# Patient Record
Sex: MALE | Race: OTHER | HISPANIC OR LATINO
[De-identification: names, ages, dates, MRNs, and addresses within clinical notes are randomized per-mention and may not be internally consistent; named-entity substitution may affect disease eponyms.]

---

## 2017-04-26 ENCOUNTER — APPOINTMENT (OUTPATIENT)
Dept: ORTHOPEDIC SURGERY | Facility: CLINIC | Age: 72
End: 2017-04-26

## 2017-05-22 ENCOUNTER — APPOINTMENT (OUTPATIENT)
Dept: ORTHOPEDIC SURGERY | Facility: CLINIC | Age: 72
End: 2017-05-22

## 2017-05-31 ENCOUNTER — APPOINTMENT (OUTPATIENT)
Dept: ORTHOPEDIC SURGERY | Facility: CLINIC | Age: 72
End: 2017-05-31

## 2017-05-31 VITALS — SYSTOLIC BLOOD PRESSURE: 133 MMHG | HEART RATE: 70 BPM | DIASTOLIC BLOOD PRESSURE: 75 MMHG

## 2017-05-31 VITALS — BODY MASS INDEX: 22.9 KG/M2 | WEIGHT: 160 LBS | HEIGHT: 70 IN

## 2017-05-31 DIAGNOSIS — M41.86 OTHER FORMS OF SCOLIOSIS, LUMBAR REGION: ICD-10-CM

## 2017-05-31 DIAGNOSIS — M54.42 LUMBAGO WITH SCIATICA, LEFT SIDE: ICD-10-CM

## 2017-05-31 DIAGNOSIS — M47.26 OTHER SPONDYLOSIS WITH RADICULOPATHY, LUMBAR REGION: ICD-10-CM

## 2017-05-31 DIAGNOSIS — G89.29 LUMBAGO WITH SCIATICA, LEFT SIDE: ICD-10-CM

## 2017-05-31 DIAGNOSIS — M54.16 RADICULOPATHY, LUMBAR REGION: ICD-10-CM

## 2017-06-23 ENCOUNTER — APPOINTMENT (OUTPATIENT)
Dept: ORTHOPEDIC SURGERY | Facility: CLINIC | Age: 72
End: 2017-06-23

## 2018-09-06 ENCOUNTER — APPOINTMENT (OUTPATIENT)
Dept: INTERNAL MEDICINE | Facility: CLINIC | Age: 73
End: 2018-09-06
Payer: MEDICARE

## 2018-09-06 ENCOUNTER — NON-APPOINTMENT (OUTPATIENT)
Age: 73
End: 2018-09-06

## 2018-09-06 VITALS
HEART RATE: 55 BPM | HEIGHT: 67.75 IN | DIASTOLIC BLOOD PRESSURE: 68 MMHG | SYSTOLIC BLOOD PRESSURE: 159 MMHG | BODY MASS INDEX: 24.53 KG/M2 | WEIGHT: 160 LBS

## 2018-09-06 DIAGNOSIS — Z00.00 ENCOUNTER FOR GENERAL ADULT MEDICAL EXAMINATION W/OUT ABNORMAL FINDINGS: ICD-10-CM

## 2018-09-06 DIAGNOSIS — Z87.438 PERSONAL HISTORY OF OTHER DISEASES OF MALE GENITAL ORGANS: ICD-10-CM

## 2018-09-06 LAB
BILIRUB UR QL STRIP: NORMAL
CLARITY UR: CLEAR
COLLECTION METHOD: NORMAL
GLUCOSE UR-MCNC: NORMAL
HCG UR QL: 0.2 EU/DL
HGB UR QL STRIP.AUTO: NORMAL
KETONES UR-MCNC: NORMAL
LEUKOCYTE ESTERASE UR QL STRIP: NORMAL
NITRITE UR QL STRIP: NORMAL
PH UR STRIP: 6.5
PROT UR STRIP-MCNC: NORMAL
SP GR UR STRIP: 1.01

## 2018-09-06 PROCEDURE — 36415 COLL VENOUS BLD VENIPUNCTURE: CPT

## 2018-09-06 PROCEDURE — 93000 ELECTROCARDIOGRAM COMPLETE: CPT | Mod: 59

## 2018-09-06 PROCEDURE — 81003 URINALYSIS AUTO W/O SCOPE: CPT | Mod: QW

## 2018-09-06 PROCEDURE — G0438: CPT

## 2018-09-06 PROCEDURE — 90670 PCV13 VACCINE IM: CPT

## 2018-09-06 PROCEDURE — G0009: CPT

## 2018-09-10 LAB
25(OH)D3 SERPL-MCNC: 41.2 NG/ML
ALBUMIN SERPL ELPH-MCNC: 4.9 G/DL
ALP BLD-CCNC: 50 U/L
ALT SERPL-CCNC: 13 U/L
ANION GAP SERPL CALC-SCNC: 13 MMOL/L
AST SERPL-CCNC: 24 U/L
BASOPHILS # BLD AUTO: 0.02 K/UL
BASOPHILS NFR BLD AUTO: 0.4 %
BILIRUB SERPL-MCNC: 0.4 MG/DL
BUN SERPL-MCNC: 26 MG/DL
CALCIUM SERPL-MCNC: 10 MG/DL
CHLORIDE SERPL-SCNC: 97 MMOL/L
CHOLEST SERPL-MCNC: 157 MG/DL
CHOLEST/HDLC SERPL: 5.1 RATIO
CO2 SERPL-SCNC: 30 MMOL/L
CREAT SERPL-MCNC: 1.39 MG/DL
EOSINOPHIL # BLD AUTO: 0.19 K/UL
EOSINOPHIL NFR BLD AUTO: 3.6 %
GLUCOSE SERPL-MCNC: 96 MG/DL
HBA1C MFR BLD HPLC: 5.5 %
HCT VFR BLD CALC: 41.9 %
HCV RNA SERPL NAA DL=5-ACNC: NOT DETECTED
HCV RNA SERPL NAA+PROBE-LOG IU: NOT DETECTED LOGIU/ML
HDLC SERPL-MCNC: 31 MG/DL
HGB BLD-MCNC: 13.6 G/DL
IMM GRANULOCYTES NFR BLD AUTO: 0.2 %
LDLC SERPL CALC-MCNC: 71 MG/DL
LYMPHOCYTES # BLD AUTO: 1.86 K/UL
LYMPHOCYTES NFR BLD AUTO: 34.9 %
MAN DIFF?: NORMAL
MCHC RBC-ENTMCNC: 31.3 PG
MCHC RBC-ENTMCNC: 32.5 GM/DL
MCV RBC AUTO: 96.5 FL
MONOCYTES # BLD AUTO: 0.48 K/UL
MONOCYTES NFR BLD AUTO: 9 %
NEUTROPHILS # BLD AUTO: 2.77 K/UL
NEUTROPHILS NFR BLD AUTO: 51.9 %
PLATELET # BLD AUTO: 176 K/UL
POTASSIUM SERPL-SCNC: 4.8 MMOL/L
PROT SERPL-MCNC: 8.1 G/DL
PSA SERPL-MCNC: 0.46 NG/ML
RBC # BLD: 4.34 M/UL
RBC # FLD: 12.4 %
SAVE SPECIMEN: NORMAL
SODIUM SERPL-SCNC: 140 MMOL/L
T3FREE SERPL-MCNC: 2.94 PG/ML
T4 SERPL-MCNC: 6.7 UG/DL
TRIGL SERPL-MCNC: 273 MG/DL
TSH SERPL-ACNC: 3.42 UIU/ML
URATE SERPL-MCNC: 7.3 MG/DL
WBC # FLD AUTO: 5.33 K/UL

## 2018-09-10 NOTE — ASSESSMENT
[FreeTextEntry1] : Patient with history of PRescription pain medication abuse, has been clean and on methadone program for many years.  Remains on methadone 145mg daily. He will continue to follow up with  the methadone clinic.  Reccomended screening colonoscopy.  \par \par Recommended strongly he go for a screening colonoscopy;\par \par Hx of hepatitis C check LFT's and Hep C Viral load.  Confirm remission.\par \par Patient has a history of hypertension was doing well on amlodipine benazepril, will simply renew this.  \par \par Hx of neuropathy, unclear cause, no history of diabetes continue gabapentin 300mg PO BID.  \par \par COntinue Aspirin 81mg for cardioprotection.

## 2018-09-10 NOTE — PHYSICAL EXAM
[No Acute Distress] : no acute distress [Well Nourished] : well nourished [Well Developed] : well developed [Well-Appearing] : well-appearing [Normal Sclera/Conjunctiva] : normal sclera/conjunctiva [PERRL] : pupils equal round and reactive to light [EOMI] : extraocular movements intact [Normal Outer Ear/Nose] : the outer ears and nose were normal in appearance [Normal Oropharynx] : the oropharynx was normal [No JVD] : no jugular venous distention [Supple] : supple [No Lymphadenopathy] : no lymphadenopathy [Thyroid Normal, No Nodules] : the thyroid was normal and there were no nodules present [No Respiratory Distress] : no respiratory distress  [Clear to Auscultation] : lungs were clear to auscultation bilaterally [No Accessory Muscle Use] : no accessory muscle use [Normal Rate] : normal rate  [Regular Rhythm] : with a regular rhythm [Normal S1, S2] : normal S1 and S2 [No Murmur] : no murmur heard [No Carotid Bruits] : no carotid bruits [No Abdominal Bruit] : a ~M bruit was not heard ~T in the abdomen [No Varicosities] : no varicosities [Pedal Pulses Present] : the pedal pulses are present [No Edema] : there was no peripheral edema [No Extremity Clubbing/Cyanosis] : no extremity clubbing/cyanosis [No Palpable Aorta] : no palpable aorta [Soft] : abdomen soft [Non Tender] : non-tender [Non-distended] : non-distended [No Masses] : no abdominal mass palpated [No HSM] : no HSM [Normal Bowel Sounds] : normal bowel sounds [Normal Sphincter Tone] : normal sphincter tone [No Mass] : no mass [Stool Occult Blood] : stool negative for occult blood [Normal Posterior Cervical Nodes] : no posterior cervical lymphadenopathy [Normal Anterior Cervical Nodes] : no anterior cervical lymphadenopathy [No CVA Tenderness] : no CVA  tenderness [No Spinal Tenderness] : no spinal tenderness [No Joint Swelling] : no joint swelling [Grossly Normal Strength/Tone] : grossly normal strength/tone [No Rash] : no rash [Normal Gait] : normal gait [Coordination Grossly Intact] : coordination grossly intact [No Focal Deficits] : no focal deficits [Deep Tendon Reflexes (DTR)] : deep tendon reflexes were 2+ and symmetric [Speech Grossly Normal] : speech grossly normal [Memory Grossly Normal] : memory grossly normal [Normal Affect] : the affect was normal [Normal Mood] : the mood was normal [Normal Insight/Judgement] : insight and judgment were intact [de-identified] : normal sized prostate [de-identified] : A number of tattoos across body.

## 2018-09-10 NOTE — HISTORY OF PRESENT ILLNESS
[de-identified] : This is a 72 year old man who presents for CPE and chagne in primary.  Used to see Dr. Sukhjinder Gould a few years ago  treated mostly for hypertension.  He has been on methadone for a number of years.  They have been managing him after Dr. Gould retired but he needed a stable primary.  \par was on Lotrel  ran out for a week 5/10mg\par \par  takes a 81mg aspirin daily too.  \par \par Hx of heroine use, quit 20 years ago in the methadone clinic 145mg of methadone daily for many years.\par \par quit smoking. \par Was in the [facility] .  \par Never had a pneumonia vaccine.  \par \par Has not had a colonoscopy before.  \par \par Has a neuropathy from diabetes.

## 2018-10-05 ENCOUNTER — RX RENEWAL (OUTPATIENT)
Age: 73
End: 2018-10-05

## 2018-10-12 ENCOUNTER — MESSAGE (OUTPATIENT)
Age: 73
End: 2018-10-12

## 2018-10-12 LAB — HEMOCCULT STL QL IA: POSITIVE

## 2018-10-23 ENCOUNTER — APPOINTMENT (OUTPATIENT)
Dept: GASTROENTEROLOGY | Facility: CLINIC | Age: 73
End: 2018-10-23
Payer: MEDICARE

## 2018-10-23 VITALS
WEIGHT: 160 LBS | BODY MASS INDEX: 24.53 KG/M2 | DIASTOLIC BLOOD PRESSURE: 72 MMHG | SYSTOLIC BLOOD PRESSURE: 137 MMHG | HEART RATE: 62 BPM | HEIGHT: 67.75 IN

## 2018-10-23 DIAGNOSIS — R19.5 OTHER FECAL ABNORMALITIES: ICD-10-CM

## 2018-10-23 PROCEDURE — 99204 OFFICE O/P NEW MOD 45 MIN: CPT

## 2018-11-09 ENCOUNTER — APPOINTMENT (OUTPATIENT)
Dept: GASTROENTEROLOGY | Facility: CLINIC | Age: 73
End: 2018-11-09

## 2018-12-03 ENCOUNTER — MEDICATION RENEWAL (OUTPATIENT)
Age: 73
End: 2018-12-03

## 2018-12-03 RX ORDER — AMLODIPINE BESYLATE AND BENAZEPRIL HYDROCHLORIDE 5; 10 MG/1; MG/1
5-10 CAPSULE ORAL
Qty: 90 | Refills: 3 | Status: ACTIVE | COMMUNITY
Start: 2018-09-06 | End: 1900-01-01

## 2018-12-03 RX ORDER — ASPIRIN ENTERIC COATED TABLETS 81 MG 81 MG/1
81 TABLET, DELAYED RELEASE ORAL DAILY
Qty: 90 | Refills: 3 | Status: ACTIVE | COMMUNITY
Start: 2018-09-06 | End: 1900-01-01

## 2018-12-10 ENCOUNTER — MESSAGE (OUTPATIENT)
Age: 73
End: 2018-12-10

## 2019-01-23 LAB — HEMOCCULT STL QL IA: NEGATIVE

## 2019-05-20 ENCOUNTER — APPOINTMENT (OUTPATIENT)
Dept: ORTHOPEDIC SURGERY | Facility: CLINIC | Age: 74
End: 2019-05-20

## 2019-07-24 ENCOUNTER — APPOINTMENT (OUTPATIENT)
Dept: INTERNAL MEDICINE | Facility: CLINIC | Age: 74
End: 2019-07-24
Payer: MEDICARE

## 2019-07-24 VITALS
TEMPERATURE: 97.8 F | OXYGEN SATURATION: 95 % | HEIGHT: 68 IN | DIASTOLIC BLOOD PRESSURE: 68 MMHG | BODY MASS INDEX: 25.01 KG/M2 | SYSTOLIC BLOOD PRESSURE: 124 MMHG | WEIGHT: 165 LBS | HEART RATE: 82 BPM

## 2019-07-24 VITALS — DIASTOLIC BLOOD PRESSURE: 70 MMHG | SYSTOLIC BLOOD PRESSURE: 128 MMHG

## 2019-07-24 DIAGNOSIS — I10 ESSENTIAL (PRIMARY) HYPERTENSION: ICD-10-CM

## 2019-07-24 DIAGNOSIS — K59.09 OTHER CONSTIPATION: ICD-10-CM

## 2019-07-24 DIAGNOSIS — Z87.891 PERSONAL HISTORY OF NICOTINE DEPENDENCE: ICD-10-CM

## 2019-07-24 DIAGNOSIS — G47.00 INSOMNIA, UNSPECIFIED: ICD-10-CM

## 2019-07-24 DIAGNOSIS — F11.10 OPIOID ABUSE, UNCOMPLICATED: ICD-10-CM

## 2019-07-24 PROCEDURE — 99204 OFFICE O/P NEW MOD 45 MIN: CPT | Mod: 25

## 2019-07-24 PROCEDURE — G0444 DEPRESSION SCREEN ANNUAL: CPT | Mod: 59

## 2019-07-24 RX ORDER — TRAZODONE HYDROCHLORIDE 50 MG/1
50 TABLET ORAL
Qty: 30 | Refills: 3 | Status: ACTIVE | COMMUNITY

## 2019-07-24 RX ORDER — SILDENAFIL 20 MG/1
20 TABLET ORAL
Qty: 50 | Refills: 1 | Status: DISCONTINUED | COMMUNITY
Start: 2018-09-06 | End: 2019-07-24

## 2019-07-24 RX ORDER — POLYETHYLENE GLYCOL 3350 AND ELECTROLYTES WITH LEMON FLAVOR 236; 22.74; 6.74; 5.86; 2.97 G/4L; G/4L; G/4L; G/4L; G/4L
236 POWDER, FOR SOLUTION ORAL
Qty: 1 | Refills: 0 | Status: DISCONTINUED | COMMUNITY
Start: 2018-10-29 | End: 2019-07-24

## 2019-07-24 RX ORDER — GABAPENTIN 300 MG/1
300 CAPSULE ORAL TWICE DAILY
Qty: 60 | Refills: 0 | Status: DISCONTINUED | COMMUNITY
Start: 2018-09-06 | End: 2019-07-24

## 2019-07-24 NOTE — HISTORY OF PRESENT ILLNESS
[Spouse] : spouse [FreeTextEntry1] : F/U [de-identified] : 74 yo male comes for transfer of medical care. He saw Dr. Sean Morales once in 9/18 for a CPE but no longer practices primary care. Previously saw Dr. Sukhjinder Gould in [place] who retired.\par \par Hx of HTN - on Lotrel. Compliant with medication.\par \par He follows at the [facility] methadone clinic - on 145 mg daily. Previous history of heroin abuse > 20 yr ago. Had blood work last month at clinic - creatinine notably elevated at 1.5. \par \par He missed his colonoscopy in 11/18. He tells me he was unable to tolerate the prep. He had a repeat FIT (patient thinks was Cologuard) in 1/19 which was negative for blood. 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 He takes Colace daily which provides relief. \par \par He has chronic pain in right hand. Likely has Dupuytren's contracture with 5th digit stuck in flexed position. He thinks has had condition for past few years. He injured right shoulder 2 weeks ago. The pain is slowly improving. He is wearing a sling. \par

## 2019-07-24 NOTE — ASSESSMENT
[FreeTextEntry1] : 1. Hypertension: BP is well-controlled on Amlodipine-Benazepril. Low salt diet.\par \par 2. Patient likely has Duputyren's contracture of right hand involving 5th digit. Referred to Dr. Gatito Ramey for management - pt had appointment with him in 5/19 but was a no-show. He also has right shoulder pain x 2 weeks but slowly improving, monitor for now - consider ortho evaluation if persists.\par \par 3. Chronic methadone-induced constipation. Continue Colace daily. Suggest using Senna at bedtime. He was also recommended to try Miralax daily. He has declined rescheduling colonoscopy as he cannot tolerate the prep. \par \par RTO 9/19 for annual exam.

## 2019-07-24 NOTE — REVIEW OF SYSTEMS
[Negative] : Heme/Lymph [Joint Pain] : joint pain [Back Pain] : no back pain [FreeTextEntry9] : right shoulder / hand pain

## 2019-07-24 NOTE — PHYSICAL EXAM
[No Acute Distress] : no acute distress [Well Nourished] : well nourished [Well Developed] : well developed [Well-Appearing] : well-appearing [Normal Sclera/Conjunctiva] : normal sclera/conjunctiva [PERRL] : pupils equal round and reactive to light [EOMI] : extraocular movements intact [Normal Outer Ear/Nose] : the outer ears and nose were normal in appearance [Normal Oropharynx] : the oropharynx was normal [Supple] : supple [No Lymphadenopathy] : no lymphadenopathy [Thyroid Normal, No Nodules] : the thyroid was normal and there were no nodules present [No Respiratory Distress] : no respiratory distress  [Clear to Auscultation] : lungs were clear to auscultation bilaterally [No Accessory Muscle Use] : no accessory muscle use [Normal Rate] : normal rate  [Regular Rhythm] : with a regular rhythm [Normal S1, S2] : normal S1 and S2 [No Murmur] : no murmur heard [Pedal Pulses Present] : the pedal pulses are present [No Edema] : there was no peripheral edema [Soft] : abdomen soft [Non Tender] : non-tender [Non-distended] : non-distended [No Masses] : no abdominal mass palpated [No HSM] : no HSM [Normal Bowel Sounds] : normal bowel sounds [Normal Sphincter Tone] : normal sphincter tone [No Mass] : no mass [No CVA Tenderness] : no CVA  tenderness [No Spinal Tenderness] : no spinal tenderness [No Joint Swelling] : no joint swelling [Grossly Normal Strength/Tone] : grossly normal strength/tone [No Rash] : no rash [Normal Gait] : normal gait [Normal Affect] : the affect was normal [Normal Mood] : the mood was normal [Normal Voice/Communication] : normal voice/communication [Multiple Tattoos] : multiple tattoos observed [Chest] : on the chest [Lesions On Shoulders] : on the right shoulder [Back] : on the back [de-identified] : friendly male, thin appearance  [de-identified] : right hand: 5th finger stuck in flexed position, some thickening of skin near base of 5th digit; right shoulder - slight limited ROM

## 2019-08-01 ENCOUNTER — APPOINTMENT (OUTPATIENT)
Dept: ORTHOPEDIC SURGERY | Facility: CLINIC | Age: 74
End: 2019-08-01
Payer: MEDICARE

## 2019-08-01 VITALS
HEIGHT: 68 IN | SYSTOLIC BLOOD PRESSURE: 134 MMHG | DIASTOLIC BLOOD PRESSURE: 75 MMHG | BODY MASS INDEX: 25.01 KG/M2 | HEART RATE: 53 BPM | WEIGHT: 165 LBS

## 2019-08-01 DIAGNOSIS — M62.40 CONTRACTURE OF MUSCLE, UNSPECIFIED SITE: ICD-10-CM

## 2019-08-01 DIAGNOSIS — G62.9 POLYNEUROPATHY, UNSPECIFIED: ICD-10-CM

## 2019-08-01 DIAGNOSIS — M24.541 CONTRACTURE, RIGHT HAND: ICD-10-CM

## 2019-08-01 PROCEDURE — 99203 OFFICE O/P NEW LOW 30 MIN: CPT

## 2022-03-23 ENCOUNTER — APPOINTMENT (OUTPATIENT)
Dept: NEUROLOGY | Facility: CLINIC | Age: 77
End: 2022-03-23

## 2022-08-22 ENCOUNTER — APPOINTMENT (OUTPATIENT)
Dept: INTERNAL MEDICINE | Facility: CLINIC | Age: 77
End: 2022-08-22

## 2022-12-06 ENCOUNTER — INPATIENT (INPATIENT)
Facility: HOSPITAL | Age: 77
LOS: 1 days | Discharge: HOME HEALTH | End: 2022-12-08
Attending: INTERNAL MEDICINE | Admitting: INTERNAL MEDICINE

## 2022-12-06 VITALS
TEMPERATURE: 98 F | HEART RATE: 70 BPM | DIASTOLIC BLOOD PRESSURE: 109 MMHG | RESPIRATION RATE: 16 BRPM | SYSTOLIC BLOOD PRESSURE: 203 MMHG | OXYGEN SATURATION: 100 %

## 2022-12-06 DIAGNOSIS — I21.4 NON-ST ELEVATION (NSTEMI) MYOCARDIAL INFARCTION: ICD-10-CM

## 2022-12-06 DIAGNOSIS — G93.40 ENCEPHALOPATHY, UNSPECIFIED: ICD-10-CM

## 2022-12-06 DIAGNOSIS — F11.20 OPIOID DEPENDENCE, UNCOMPLICATED: ICD-10-CM

## 2022-12-06 DIAGNOSIS — Z00.00 ENCOUNTER FOR GENERAL ADULT MEDICAL EXAMINATION WITHOUT ABNORMAL FINDINGS: ICD-10-CM

## 2022-12-06 DIAGNOSIS — I16.0 HYPERTENSIVE URGENCY: ICD-10-CM

## 2022-12-06 DIAGNOSIS — R77.8 OTHER SPECIFIED ABNORMALITIES OF PLASMA PROTEINS: ICD-10-CM

## 2022-12-06 DIAGNOSIS — R41.82 ALTERED MENTAL STATUS, UNSPECIFIED: ICD-10-CM

## 2022-12-06 DIAGNOSIS — R60.0 LOCALIZED EDEMA: ICD-10-CM

## 2022-12-06 LAB
A1C WITH ESTIMATED AVERAGE GLUCOSE RESULT: 5.5 % — SIGNIFICANT CHANGE UP (ref 4–5.6)
AMPHET UR-MCNC: NEGATIVE — SIGNIFICANT CHANGE UP
APAP SERPL-MCNC: <10 UG/ML — LOW (ref 15–25)
APPEARANCE UR: CLEAR — SIGNIFICANT CHANGE UP
APTT BLD: 28.9 SEC — SIGNIFICANT CHANGE UP (ref 27–36.3)
BARBITURATES UR SCN-MCNC: NEGATIVE — SIGNIFICANT CHANGE UP
BASE EXCESS BLDV CALC-SCNC: 2 MMOL/L — SIGNIFICANT CHANGE UP (ref -2–3)
BASE EXCESS BLDV CALC-SCNC: 5.1 MMOL/L — HIGH (ref -2–3)
BASOPHILS # BLD AUTO: 0.02 K/UL — SIGNIFICANT CHANGE UP (ref 0–0.2)
BASOPHILS NFR BLD AUTO: 0.3 % — SIGNIFICANT CHANGE UP (ref 0–2)
BENZODIAZ UR-MCNC: NEGATIVE — SIGNIFICANT CHANGE UP
BILIRUB UR-MCNC: NEGATIVE — SIGNIFICANT CHANGE UP
BLOOD GAS VENOUS COMPREHENSIVE RESULT: SIGNIFICANT CHANGE UP
CHLORIDE BLDV-SCNC: 99 MMOL/L — SIGNIFICANT CHANGE UP (ref 96–108)
CHOLEST SERPL-MCNC: 116 MG/DL — SIGNIFICANT CHANGE UP
CO2 BLDV-SCNC: 26.7 MMOL/L — HIGH (ref 22–26)
CO2 BLDV-SCNC: 29.2 MMOL/L — HIGH (ref 22–26)
COCAINE METAB.OTHER UR-MCNC: NEGATIVE — SIGNIFICANT CHANGE UP
COLOR SPEC: YELLOW — SIGNIFICANT CHANGE UP
CREATININE URINE RESULT, DAU: 102 MG/DL — SIGNIFICANT CHANGE UP
DIFF PNL FLD: NEGATIVE — SIGNIFICANT CHANGE UP
EOSINOPHIL # BLD AUTO: 0.01 K/UL — SIGNIFICANT CHANGE UP (ref 0–0.5)
EOSINOPHIL NFR BLD AUTO: 0.1 % — SIGNIFICANT CHANGE UP (ref 0–6)
ESTIMATED AVERAGE GLUCOSE: 111 — SIGNIFICANT CHANGE UP
ETHANOL SERPL-MCNC: <10 MG/DL — SIGNIFICANT CHANGE UP
FLUAV AG NPH QL: SIGNIFICANT CHANGE UP
FLUBV AG NPH QL: SIGNIFICANT CHANGE UP
GAS PNL BLDV: 136 MMOL/L — SIGNIFICANT CHANGE UP (ref 136–145)
GAS PNL BLDV: SIGNIFICANT CHANGE UP
GLUCOSE BLDV-MCNC: 110 MG/DL — HIGH (ref 70–99)
GLUCOSE UR QL: NEGATIVE — SIGNIFICANT CHANGE UP
HCO3 BLDV-SCNC: 26 MMOL/L — SIGNIFICANT CHANGE UP (ref 22–29)
HCO3 BLDV-SCNC: 28 MMOL/L — SIGNIFICANT CHANGE UP (ref 22–29)
HCT VFR BLD CALC: 40.4 % — SIGNIFICANT CHANGE UP (ref 39–50)
HCT VFR BLDA CALC: 42 % — SIGNIFICANT CHANGE UP (ref 39–51)
HDLC SERPL-MCNC: 41 MG/DL — SIGNIFICANT CHANGE UP
HGB BLD CALC-MCNC: 14.1 G/DL — SIGNIFICANT CHANGE UP (ref 13–17)
HGB BLD-MCNC: 13.9 G/DL — SIGNIFICANT CHANGE UP (ref 13–17)
IANC: 5.29 K/UL — SIGNIFICANT CHANGE UP (ref 1.8–7.4)
IMM GRANULOCYTES NFR BLD AUTO: 0.3 % — SIGNIFICANT CHANGE UP (ref 0–0.9)
INR BLD: 1.2 RATIO — HIGH (ref 0.88–1.16)
KETONES UR-MCNC: ABNORMAL
LACTATE BLDV-MCNC: 2.4 MMOL/L — HIGH (ref 0.5–2)
LEUKOCYTE ESTERASE UR-ACNC: NEGATIVE — SIGNIFICANT CHANGE UP
LIDOCAIN IGE QN: 23 U/L — SIGNIFICANT CHANGE UP (ref 7–60)
LIPID PNL WITH DIRECT LDL SERPL: 62 MG/DL — SIGNIFICANT CHANGE UP
LYMPHOCYTES # BLD AUTO: 1.24 K/UL — SIGNIFICANT CHANGE UP (ref 1–3.3)
LYMPHOCYTES # BLD AUTO: 17.7 % — SIGNIFICANT CHANGE UP (ref 13–44)
MAGNESIUM SERPL-MCNC: 1.9 MG/DL — SIGNIFICANT CHANGE UP (ref 1.6–2.6)
MCHC RBC-ENTMCNC: 31.2 PG — SIGNIFICANT CHANGE UP (ref 27–34)
MCHC RBC-ENTMCNC: 34.4 GM/DL — SIGNIFICANT CHANGE UP (ref 32–36)
MCV RBC AUTO: 90.6 FL — SIGNIFICANT CHANGE UP (ref 80–100)
METHADONE UR-MCNC: POSITIVE
MONOCYTES # BLD AUTO: 0.44 K/UL — SIGNIFICANT CHANGE UP (ref 0–0.9)
MONOCYTES NFR BLD AUTO: 6.3 % — SIGNIFICANT CHANGE UP (ref 2–14)
NEUTROPHILS # BLD AUTO: 5.29 K/UL — SIGNIFICANT CHANGE UP (ref 1.8–7.4)
NEUTROPHILS NFR BLD AUTO: 75.3 % — SIGNIFICANT CHANGE UP (ref 43–77)
NITRITE UR-MCNC: NEGATIVE — SIGNIFICANT CHANGE UP
NON HDL CHOLESTEROL: 75 MG/DL — SIGNIFICANT CHANGE UP
NRBC # BLD: 0 /100 WBCS — SIGNIFICANT CHANGE UP (ref 0–0)
NRBC # FLD: 0 K/UL — SIGNIFICANT CHANGE UP (ref 0–0)
OPIATES UR-MCNC: NEGATIVE — SIGNIFICANT CHANGE UP
OXYCODONE UR-MCNC: NEGATIVE — SIGNIFICANT CHANGE UP
PCO2 BLDV: 27 MMHG — LOW (ref 42–55)
PCO2 BLDV: 47 MMHG — SIGNIFICANT CHANGE UP (ref 42–55)
PCP SPEC-MCNC: SIGNIFICANT CHANGE UP
PCP UR-MCNC: NEGATIVE — SIGNIFICANT CHANGE UP
PH BLDV: 7.38 — SIGNIFICANT CHANGE UP (ref 7.32–7.43)
PH BLDV: 7.59 — HIGH (ref 7.32–7.43)
PH UR: 7.5 — SIGNIFICANT CHANGE UP (ref 5–8)
PHOSPHATE SERPL-MCNC: 1.2 MG/DL — LOW (ref 2.5–4.5)
PLATELET # BLD AUTO: 211 K/UL — SIGNIFICANT CHANGE UP (ref 150–400)
PO2 BLDV: 35 MMHG — SIGNIFICANT CHANGE UP
PO2 BLDV: 46 MMHG — SIGNIFICANT CHANGE UP
POTASSIUM BLDV-SCNC: 4.4 MMOL/L — SIGNIFICANT CHANGE UP (ref 3.5–5.1)
PROT UR-MCNC: ABNORMAL
PROTHROM AB SERPL-ACNC: 14 SEC — HIGH (ref 10.5–13.4)
RBC # BLD: 4.46 M/UL — SIGNIFICANT CHANGE UP (ref 4.2–5.8)
RBC # FLD: 11.8 % — SIGNIFICANT CHANGE UP (ref 10.3–14.5)
RSV RNA NPH QL NAA+NON-PROBE: SIGNIFICANT CHANGE UP
SALICYLATES SERPL-MCNC: <0.3 MG/DL — LOW (ref 15–30)
SAO2 % BLDV: 69 % — SIGNIFICANT CHANGE UP
SAO2 % BLDV: 75.3 % — SIGNIFICANT CHANGE UP
SARS-COV-2 RNA SPEC QL NAA+PROBE: SIGNIFICANT CHANGE UP
SP GR SPEC: 1.02 — SIGNIFICANT CHANGE UP (ref 1.01–1.05)
THC UR QL: NEGATIVE — SIGNIFICANT CHANGE UP
TOXICOLOGY SCREEN, DRUGS OF ABUSE, SERUM RESULT: SIGNIFICANT CHANGE UP
TRIGL SERPL-MCNC: 64 MG/DL — SIGNIFICANT CHANGE UP
TROPONIN T, HIGH SENSITIVITY RESULT: 42 NG/L — SIGNIFICANT CHANGE UP
TROPONIN T, HIGH SENSITIVITY RESULT: 44 NG/L — SIGNIFICANT CHANGE UP
TSH SERPL-MCNC: 2.43 UIU/ML — SIGNIFICANT CHANGE UP (ref 0.27–4.2)
UROBILINOGEN FLD QL: SIGNIFICANT CHANGE UP
WBC # BLD: 7.02 K/UL — SIGNIFICANT CHANGE UP (ref 3.8–10.5)
WBC # FLD AUTO: 7.02 K/UL — SIGNIFICANT CHANGE UP (ref 3.8–10.5)

## 2022-12-06 PROCEDURE — 99223 1ST HOSP IP/OBS HIGH 75: CPT | Mod: GC,AI

## 2022-12-06 PROCEDURE — 71046 X-RAY EXAM CHEST 2 VIEWS: CPT | Mod: 26

## 2022-12-06 PROCEDURE — 70450 CT HEAD/BRAIN W/O DYE: CPT | Mod: 26,MB

## 2022-12-06 PROCEDURE — 99223 1ST HOSP IP/OBS HIGH 75: CPT

## 2022-12-06 PROCEDURE — 99291 CRITICAL CARE FIRST HOUR: CPT

## 2022-12-06 RX ORDER — SODIUM CHLORIDE 9 MG/ML
1000 INJECTION INTRAMUSCULAR; INTRAVENOUS; SUBCUTANEOUS ONCE
Refills: 0 | Status: COMPLETED | OUTPATIENT
Start: 2022-12-06 | End: 2022-12-06

## 2022-12-06 RX ORDER — HALOPERIDOL DECANOATE 100 MG/ML
5 INJECTION INTRAMUSCULAR ONCE
Refills: 0 | Status: COMPLETED | OUTPATIENT
Start: 2022-12-06 | End: 2022-12-06

## 2022-12-06 RX ORDER — ENOXAPARIN SODIUM 100 MG/ML
40 INJECTION SUBCUTANEOUS EVERY 24 HOURS
Refills: 0 | Status: DISCONTINUED | OUTPATIENT
Start: 2022-12-06 | End: 2022-12-07

## 2022-12-06 RX ORDER — SODIUM,POTASSIUM PHOSPHATES 278-250MG
1 POWDER IN PACKET (EA) ORAL ONCE
Refills: 0 | Status: DISCONTINUED | OUTPATIENT
Start: 2022-12-06 | End: 2022-12-06

## 2022-12-06 RX ORDER — ASPIRIN/CALCIUM CARB/MAGNESIUM 324 MG
325 TABLET ORAL ONCE
Refills: 0 | Status: COMPLETED | OUTPATIENT
Start: 2022-12-06 | End: 2022-12-06

## 2022-12-06 RX ORDER — ACETAMINOPHEN 500 MG
650 TABLET ORAL EVERY 6 HOURS
Refills: 0 | Status: DISCONTINUED | OUTPATIENT
Start: 2022-12-06 | End: 2022-12-08

## 2022-12-06 RX ORDER — SODIUM,POTASSIUM PHOSPHATES 278-250MG
1 POWDER IN PACKET (EA) ORAL ONCE
Refills: 0 | Status: COMPLETED | OUTPATIENT
Start: 2022-12-06 | End: 2022-12-06

## 2022-12-06 RX ORDER — LABETALOL HCL 100 MG
100 TABLET ORAL ONCE
Refills: 0 | Status: COMPLETED | OUTPATIENT
Start: 2022-12-06 | End: 2022-12-06

## 2022-12-06 RX ADMIN — Medication 325 MILLIGRAM(S): at 12:24

## 2022-12-06 RX ADMIN — SODIUM CHLORIDE 1000 MILLILITER(S): 9 INJECTION INTRAMUSCULAR; INTRAVENOUS; SUBCUTANEOUS at 10:17

## 2022-12-06 RX ADMIN — Medication 85 MILLIMOLE(S): at 21:29

## 2022-12-06 RX ADMIN — Medication 100 MILLIGRAM(S): at 18:40

## 2022-12-06 RX ADMIN — Medication 1 MILLIGRAM(S): at 20:06

## 2022-12-06 RX ADMIN — Medication 2 MILLIGRAM(S): at 10:15

## 2022-12-06 RX ADMIN — Medication 1 PACKET(S): at 12:25

## 2022-12-06 NOTE — H&P ADULT - PROBLEM SELECTOR PLAN 2
Pt w/ increased irritability, anxiety, forgetfulness, insomnia, hypertensive, tachycardic   Given hx of addiction disorder, pt may be in acute withdrawal   Alcohol level <10  Will obtain Utox, HIV   Will place on CIWA protocol  Repleting electrolytes  BH

## 2022-12-06 NOTE — H&P ADULT - PROBLEM SELECTOR PLAN 4
Pt w/ elevated troponins 57->42, likely iso hypertensive urgency  EKG NSR, non-concerning for acute cardiac event   pro-, less likely acute heart failure  Pt asymptomatic   Will place on telemetry   TTE  Obtain A1c, lipid profile for risk evaluation

## 2022-12-06 NOTE — CONSULT NOTE ADULT - ASSESSMENT
77M with history of opioid use disorder, htn, with no known cardiac history presenting with AMS, confusion, anxiety, and elevated blood pressure in the setting of stopping recent psychiatric medications. Vitals significant for BP in the 180s-190s systolic, physical exam unremarkable, ekg showing Normal sinus rhythm without any abnormalities. On my exam he is AOx-2-3 (with prompting) and pleasant. He reports no recent cardiac symptoms except for acute worsening of his chronic lower extremity edema which I was able to appreciate on exam. Labs significant for a mild elevation in troponin.     #Hypertensive urgency  #Troponin elevation   -Troponin elevation likely 2/2 elevated blood pressures which can be seen in periods of acute agitation/untreated bp blood pressure and not ACS. Would trend to peak and obtain TTE   -Please start antihypertensive medications to Reduce BP to <160/100 over several  hrs; then to normal (<130/90) over 1-3d  -Can obtain lipid panel and hemoglobin a1c for risk stratification and comorbid disease assessment   -Rest of care per ED team

## 2022-12-06 NOTE — ED PROVIDER NOTE - PROGRESS NOTE DETAILS
FIDEL Crawford PGY2 critical trop called in of 57.  and repeat ecg ordered. cards aware. FIDEL Crawford PGY2 spoke to hospitalist who is aware of the need for psychiatry in the setting of failure to thrive and thoughts of dying. CT with potential frontotemporal atrophy? read as normal. discussed possible neurology consultation with hospitalist.

## 2022-12-06 NOTE — ED PROVIDER NOTE - NSICDXPASTMEDICALHX_GEN_ALL_CORE_FT
PAST MEDICAL HISTORY:  Drug abuse and dependence Former heroin abuser, on methadone. Last use in 2013, on methadone for 45 years. Followed at [facility] methadone clinic.    Peripheral neuropathy

## 2022-12-06 NOTE — H&P ADULT - NSHPSOCIALHISTORY_GEN_ALL_CORE
Social History:  Smoking History:  Alcohol Use:  Drug Use: Pt lives with wife, retired. Denies alcohol use, smoking, drug use other than the scheduled methadone. Pt has a daughter with his wife and two stepchildren. They have a close relationship.

## 2022-12-06 NOTE — CONSULT NOTE ADULT - ATTENDING COMMENTS
personally saw and examined patient  labs and vitals reviewed  agree with above assessment and plan  pt comfortable appearing, no cv symptoms at this time, very low suspicion for acs here.   bp improved from admission  unclear if prev hx of htn, pt denies being on any antihypertensive meds at home  pt with recent stoppage of trazodone, unclear why, pt states it was not renewed  appreciate psych recs for psych meds  would be hesitant to start a significant regimen for bp, also goal today is 160s systolic.  monitor on tele  will follow with you

## 2022-12-06 NOTE — CONSULT NOTE ADULT - SUBJECTIVE AND OBJECTIVE BOX
Kevon Rubio MD  Cardiology Fellow  [phone]  All Cardiology service information can be found 24/7 on [email or web address], password: [email or web address]    Patient seen and evaluated at bedside    Chief Complaint:    HPI:  77M with history of opioid use disorder on methadone, htn not on any medications, and does not follow regularly with doctors presenting with AMS in setting of stopping Trazadone. Initially he came (history from ED and wife) with anxiety, forgetfulness, not eating, and insomnia. Reported in the ED he wishes he was dead.    On my exam this AM he is pleasant without SI. Reports no chest pain, palpitations, dyspnea, orthopnea, decreased ET but does report two weeks of LE edema R>L that is chronic.    PMHx:   Peripheral neuropathy    Drug abuse and dependence        PSHx:   No significant past surgical history        Allergies:  No Known Allergies      Home Meds:    Current Medications:       FAMILY HISTORY:      Social History:  Former smoker and ETOH user, last heroin use 2013?     REVIEW OF SYSTEMS:  CONSTITUTIONAL: No weakness, fevers or chills  EYES/ENT: No visual changes;  No dysphagia  NECK: No pain or stiffness  RESPIRATORY: No cough, wheezing, hemoptysis; No shortness of breath  CARDIOVASCULAR: No chest pain or palpitations; + le edema   GASTROINTESTINAL: No abdominal or epigastric pain. No nausea, vomiting, or hematemesis; No diarrhea or constipation. No melena or hematochezia.  BACK: No back pain  GENITOURINARY: No dysuria, frequency or hematuria  NEUROLOGICAL: No numbness or weakness  SKIN: No itching, burning, rashes, or lesions   All other review of systems is negative unless indicated above.    Physical Exam:  T(F): 99.9 (12-06), Max: 99.9 (12-06)  HR: 74 (12-06) (70 - 74)  BP: 190/88 (12-06) (190/88 - 203/109)  RR: 20 (12-06)  SpO2: 100% (12-06)  GENERAL: No acute distress, well-developed  HEAD:  Atraumatic, Normocephalic  ENT: EOMI, PERRLA, conjunctiva and sclera clear, Neck supple, No JVD, moist mucosa  CHEST/LUNG: Clear to auscultation bilaterally; No wheeze, equal breath sounds bilaterally   BACK: No spinal tenderness  HEART: Regular rate and rhythm; No murmurs, rubs, or gallops  ABDOMEN: Soft, Nontender, Nondistended; Bowel sounds present  EXTREMITIES:  LE edema R >L   PSYCH: Nl behavior, nl affect  NEUROLOGY: AAOx3, non-focal, cranial nerves intact  SKIN: Normal color, No rashes or lesions  LINES:    Cardiovascular Diagnostic Testing:    ECG:   Normal Sinus rhythm     Imaging:    CXR: Personally reviewed    Labs: Personally reviewed                        13.9   7.02  )-----------( 211      ( 06 Dec 2022 09:30 )             40.4     12-06    139  |  98  |  29<H>  ----------------------------<  102<H>  4.6   |  26  |  1.24    Ca    10.3      06 Dec 2022 09:30  Phos  1.2     12-06  Mg     1.90     12-06    TPro  7.7  /  Alb  4.9  /  TBili  1.0  /  DBili  x   /  AST  49<H>  /  ALT  26  /  AlkPhos  55  12-06    PT/INR - ( 06 Dec 2022 09:30 )   PT: 14.0 sec;   INR: 1.20 ratio         PTT - ( 06 Dec 2022 09:30 )  PTT:28.9 sec    CARDIAC MARKERS ( 06 Dec 2022 09:30 )  57 ng/L / x     / x     / x     / x     / x            Serum Pro-Brain Natriuretic Peptide: 194 pg/mL (12-06 @ 09:30)        Thyroid Stimulating Hormone, Serum: 2.43 uIU/mL (12-06 @ 09:30)

## 2022-12-06 NOTE — ED ADULT NURSE NOTE - NSICDXPASTMEDICALHX_GEN_ALL_CORE_FT
PAST MEDICAL HISTORY:  Drug abuse and dependence Former heroin abuser, on methadone. Last use in 2013, on methadone for 45 years. Followed at [facility] methadone clinic.    Peripheral neuropathy

## 2022-12-06 NOTE — ED ADULT NURSE NOTE - OBJECTIVE STATEMENT
Ptis alert and orientedx2, confused to time and situation. Pt is ambulatory at baseline, arrived to the ED with SOB, generalized weakness and confusion. Pt has no neuro deficits on exam but is confused and to why he came to the hospital. His wife says that she has been worried about him because he has been depressed recently and not taking care of himself. Pt has a ID card from [facility] but says he hasn't been there before. Pt denies chest pain but is visibly dyspnoic on exertion. Pt is NSR on the cardiac monitor. denies nausea, vomiting and diarrhea. Pt has some red skin and +1 swelling around his ankles. pt does have a history of substance abuse but did not specify, pt is on methadone (dose/ frequency unknown). B/L IV 20G placed in the FA, labs sent, waiting for urine, bed in lowest position, will continue to monitor.

## 2022-12-06 NOTE — ED ADULT NURSE NOTE - NSIMPLEMENTINTERV_GEN_ALL_ED
Implemented All Universal Safety Interventions:  [place] to call system. Call bell, personal items and telephone within reach. Instruct patient to call for assistance. Room bathroom lighting operational. Non-slip footwear when patient is off stretcher. Physically safe environment: no spills, clutter or unnecessary equipment. Stretcher in lowest position, wheels locked, appropriate side rails in place.

## 2022-12-06 NOTE — H&P ADULT - NSHPREVIEWOFSYSTEMS_GEN_ALL_CORE
REVIEW OF SYSTEMS:  CONSTITUTIONAL: +weakness, No  fevers or chills  EYES/ENT: No visual changes;  No dysphagia  NECK: No pain or stiffness  RESPIRATORY: + SOB earlier today, resolved. No cough, wheezing, hemoptysis;   CARDIOVASCULAR: + LE edema. No chest pain or palpitations;    GASTROINTESTINAL: No abdominal or epigastric pain. No nausea, vomiting, or hematemesis; No diarrhea or constipation. No melena or hematochezia.  BACK: No back pain  GENITOURINARY: + Urinary retention. No dysuria or hematuria  NEUROLOGICAL: No numbness or weakness  SKIN: No itching, burning, rashes, or lesions   All other review of systems is negative unless indicated above.

## 2022-12-06 NOTE — H&P ADULT - NSHPLABSRESULTS_GEN_ALL_CORE
Labs: Personally reviewed                        13.9   7.02  )-----------( 211      ( 06 Dec 2022 09:30 )             40.4     12-06    139  |  98  |  29<H>  ----------------------------<  102<H>  4.6   |  26  |  1.24    Ca    10.3      06 Dec 2022 09:30  Phos  1.2     12-06  Mg     1.90     12-06    TPro  7.7  /  Alb  4.9  /  TBili  1.0  /  DBili  x   /  AST  49<H>  /  ALT  26  /  AlkPhos  55  12-06    PT/INR - ( 06 Dec 2022 09:30 )   PT: 14.0 sec;   INR: 1.20 ratio         PTT - ( 06 Dec 2022 09:30 )  PTT:28.9 sec    CARDIAC MARKERS ( 06 Dec 2022 12:43 )  42 ng/L / x     / x     / x     / x     / x      CARDIAC MARKERS ( 06 Dec 2022 09:30 )  57 ng/L / x     / x     / x     / x     / x            Serum Pro-Brain Natriuretic Peptide: 194 pg/mL (12-06 @ 09:30)        Thyroid Stimulating Hormone, Serum: 2.43 uIU/mL (12-06 @ 09:30)

## 2022-12-06 NOTE — H&P ADULT - PROBLEM SELECTOR PLAN 3
Pt on chronic methadone therapy, says on 133 mg daily  Refuses today's methadone dose  Will obtain records of methadone and dose per outpatient levels

## 2022-12-06 NOTE — ED PROVIDER NOTE - CLINICAL SUMMARY MEDICAL DECISION MAKING FREE TEXT BOX
pt with long term methadone use presents with failure to thrive and unwillingness to care for himself without idenitifiable organic cause. questionable peaked T waves on ecg, will screen for hyperkalemia and other electrolyte derangements in the absence of adequate PO intake. psych consult, likely admit.

## 2022-12-06 NOTE — ED PROVIDER NOTE - ATTENDING CONTRIBUTION TO CARE
I have personally performed a face to face medical and diagnostic evaluation of the patient. I have discussed with and reviewed the Resident's note and agree with the History, ROS, Physical Exam and MDM unless otherwise indicated. A brief summary of my personal evaluation and impression can be found below.    Oneyda RODRIGUEZ: 77-year-old male history of prior heroin and abuse on methadone stopped taking trazodone 5 or 6 days ago.  Patient reports is unsure why he is in the hospital however just says that he does not feel good.  Is unable to localize symptoms further.  Has no chest pain shortness of breath no nausea no vomiting no fevers no focal weakness but complains of diffuse body weakness.  Collateral obtained by wife is that he is not acting like his normal self at home he is pacing at night not eating and irritable and not taking care of himself per wife collateral patient has been reporting that he "no longer wants to live ". Patient is unable to elaborate on what medicines he is taking.    All other ROS negative, except as above and as per HPI and ROS section.    VITALS: Initial triage and subsequent vitals have been reviewed by me.  GEN APPEARANCE: WDWN, alert, non-toxic, NAD  HEAD: Atraumatic.  EYES: PERRLa, EOMI, vision grossly intact.   NECK: Supple  CV: RRR, S1S2, no c/r/m/g. Cap refill <2 seconds. No bruits.   LUNGS: CTAB. No abnormal breath sounds.  ABDOMEN: Soft, NTND. No guarding or rebound.   MSK/EXT: No spinal or extremity point tenderness. No CVA ttp. Pelvis stable. No peripheral edema.  NEURO: Alert, follows commands. Weight bearing normal. Speech normal. Sensation and motor normal x4 extremities.   SKIN: Warm, dry and intact. No rash.  PSYCH: Appropriate    Plan/MDM:Oneyda RODRIGUEZ: 77-year-old male history of prior heroin and abuse on methadone stopped taking trazodone 5 or 6 days ago.  Patient reports is unsure why he is in the hospital however just says that he does not feel good.  Is unable to localize symptoms further.  Has no chest pain shortness of breath no nausea no vomiting no fevers no focal weakness but complains of diffuse body weakness.  Collateral obtained by wife is that he is not acting like his normal self at home he is pacing at night not eating and irritable and not taking care of himself per wife collateral patient has been reporting that he "no longer wants to live " Exam vital signs stable nontoxic-appearing.  DDx concern for possible failure to thrive versus SI possibly complicated by dementia or depression.  Will evaluate for infectious and metabolic etiologies CT head give meds as needed anticipate admission.   consult when medically cleared.

## 2022-12-06 NOTE — ED ADULT TRIAGE NOTE - CHIEF COMPLAINT QUOTE
Pt c/o flu-like symptoms, including shortness of breath, generalized malaise for the past several days, hypertensive. Pt denies any present pain.

## 2022-12-06 NOTE — H&P ADULT - ATTENDING COMMENTS
78 y/o M w/ PMH heroin use disorder now on methadone, unmanaged HTN, presents with 4 days of AMS, found to have  hypertensive urgency.   Acute metabolic encephalopathy pos sec to withdrawal , filled 30 day RX trazodone , finished less than 30 days.  Suicidal verbalization , psych follow up C/w 1:1.  check U tox   CIWA protocol   Lower BP per cardio recs, check TTE.

## 2022-12-06 NOTE — ED PROVIDER NOTE - CARE PLAN
1 Principal Discharge DX:	NSTEMI (non-ST elevation myocardial infarction)  Secondary Diagnosis:	Adult failure to thrive

## 2022-12-06 NOTE — H&P ADULT - HISTORY OF PRESENT ILLNESS
Patient seen and evaluated at bedside    HPI:  76 y/o M w/ PMH heroin use disorder now on methadone, unmanaged HTN, presents with 4 days of AMS. Pt w/ previous heroin use for 50 years, now compliant with methadone, says taking 133mg per day. Also has been taking trazodone 50mg for sleep for the past two years. Pt stopped taking trazadone 5-6d ago because he cannot get anymore (wife is unsure why, pt says can't get a hold of outpatient provider). Per surescripts, pt picked up a 30 day supply Nov 18 (prescribed by Ellyn Dwyer, addiction psychiatrist).  Per wife for the past 5-6 days,  pt has been forgetting whether or not he takes his doses, has been pacing all night, not eating,  irritable, not sleeping, not participating in self-care/bathing. Pt also endorsed some SOB previously, 2 weeks of LE edema R>L, malaise. Pt also endorses urinary retention. Pt says has not been able to urinate today, no prior hx. Denies F/C/N/V, C/D, current SOB, CP, palpitations, abd pain, HA, dizziness, blurry vision.  Denies drug use, smoking, alcohol use.     In ED, pt noted to be hypertensive to 200s with mild troponemia, which downtrended. Cardiology was consulted, troponemia likely iso hypertensive urgency. Pt received 1L IVF,  2mg Ativan, ASA.  Pt reported with unsteady gait. Endorsed suicidal ideation and was placed on a 1:1.

## 2022-12-06 NOTE — H&P ADULT - NSHPPHYSICALEXAM_GEN_ALL_CORE
Physical Exam:  T(F): 99.9 (12-06), Max: 99.9 (12-06)  HR: 78 (12-06) (70 - 78)  BP: 178/84 (12-06) (178/84 - 203/109)  RR: 18 (12-06)  SpO2: 99% (12-06)    GENERAL: No acute distress, well-developed  HEAD:  Atraumatic, Normocephalic  ENT: EOMI, PERRLA, conjunctiva and sclera clear, Neck supple, No JVD, moist mucosa  CHEST/LUNG: Clear to auscultation bilaterally; No wheeze, equal breath sounds bilaterally   BACK: No spinal tenderness  HEART: Regular rate and rhythm; No murmurs, rubs, or gallops  ABDOMEN: Soft, Nontender, Nondistended; Bowel sounds present  EXTREMITIES:  No clubbing, cyanosis, or edema  PSYCH: Nl behavior, nl affect  NEUROLOGY: AAOx3, non-focal, cranial nerves intact  SKIN: Normal color, No rashes or lesions Physical Exam:  T(F): 99.9 (12-06), Max: 99.9 (12-06)  HR: 78 (12-06) (70 - 78)  BP: 178/84 (12-06) (178/84 - 203/109)  RR: 18 (12-06)  SpO2: 99% (12-06)    GENERAL: Restless, fidgety.   HEAD:  Atraumatic, Normocephalic  ENT: EOMI, PERRLA, conjunctiva and sclera clear, Neck supple, No JVD, moist mucosa  CHEST/LUNG: Clear to auscultation bilaterally; No wheeze, equal breath sounds bilaterally   BACK: No spinal tenderness  HEART: Systolic ejection murmur right sternal border   ABDOMEN: Soft, Nontender, Nondistended; Bowel sounds present  EXTREMITIES: 1+ Pitting edema R>L,  No clubbing, cyanosis,    PSYCH: Restless, fidgety, normal affect   NEUROLOGY: AAOx3, non-focal, cranial nerves intact  SKIN: Normal color, No rashes or lesions

## 2022-12-06 NOTE — H&P ADULT - ASSESSMENT
78 y/o M w/ PMH heroin use disorder now on methadone, unmanaged HTN, presents with 4 days of AMS. 76 y/o M w/ PMH heroin use disorder now on methadone, unmanaged HTN, presents with 4 days of AMS, found to be in hypertensive urgency.

## 2022-12-06 NOTE — H&P ADULT - NSICDXPASTMEDICALHX_GEN_ALL_CORE_FT
PAST MEDICAL HISTORY:  Drug abuse and dependence Former heroin abuser, on methadone. Last use in 2013, on methadone for 45 years. Followed at [facility] methadone clinic.    Peripheral neuropathy

## 2022-12-06 NOTE — ED PROVIDER NOTE - OBJECTIVE STATEMENT
methadone for 50 years for hx heroin use. 2y ago started trazadone for insomnia. stopped taking trazadone 5-6d ago because he cannot get anymore (wife is unsure why). Wife believes he is starting to get dementia because he's been forgetting whether or not he takes his doses. pacing all night. not eating. irritable. not taking care of himself/bathing x2m. no psychiatrist. PCP - no. stating he is confused and generally weak for an unknown amount of time. wife says he's been saying "I wish I was dead"

## 2022-12-06 NOTE — ED PROVIDER NOTE - PHYSICAL EXAMINATION
General: non-toxic, NAD  HEENT: NCAT, PERRL  Cardiac: RRR, no murmurs, 2+ peripheral pulses  Resp: CTAB  Abdomen: soft, non-distended, bowel sounds present, no ttp, no rebound or guarding. no organomegaly  Extremities: mild bilateral peripheral edema, no calf tenderness, or leg size discrepancies  Skin: no rashes  Neuro: AAOx4, 5+motor, sensation grossly intact CN 2-12 intact  Psych: flat affect

## 2022-12-06 NOTE — H&P ADULT - PROBLEM SELECTOR PLAN 1
Pt w/ SBPs in 200s with mild troponemia Pt w/ SBPs in 200s with mild troponemia  Hypertensive urgency likely 2/2 acute withdrawal of unknown substance   Cardiology consulted, recs appreciated  Will give 100mg labetalol and re-evaluate BP in 1 hr   Will plan to reduced BP to ~160/100 over several  hrs; then to normal (<130/90) over 1-3d  Order TTE  Telemetry monitoring

## 2022-12-06 NOTE — ED PROVIDER NOTE - NS ED ROS FT
Constitutional: no fevers, chills +general weakness.  HEENT: no HA, vision changes, rhinorrhea, sore throat  Cardiac: no chest pain, palpitations  Respiratory: no SOB, cough or hemoptysis  GI: no n/v/d/c, abd pain, bloody or dark stools  : no dysuria, frequency, or hematuria  MSK: no joint pain, neck pain or back pain  Skin: no rashes, jaundice, pruritis  Neuro: no numbness/tingling, weakness, unsteady gait +confusion  Psych: Constitutional: no fevers, chills +general weakness.  HEENT: no HA, vision changes, rhinorrhea, sore throat  Cardiac: no chest pain, palpitations  Respiratory: no SOB, cough or hemoptysis  GI: no n/v/d/c, abd pain, bloody or dark stools  : no dysuria, frequency, or hematuria  MSK: no joint pain, neck pain or back pain  Skin: no rashes, jaundice, pruritis  Neuro: no numbness/tingling, weakness, unsteady gait +confusion  Psych: +dysphoria no SI/HI.

## 2022-12-07 DIAGNOSIS — B19.20 UNSPECIFIED VIRAL HEPATITIS C WITHOUT HEPATIC COMA: ICD-10-CM

## 2022-12-07 DIAGNOSIS — R41.89 OTHER SYMPTOMS AND SIGNS INVOLVING COGNITIVE FUNCTIONS AND AWARENESS: ICD-10-CM

## 2022-12-07 DIAGNOSIS — F11.20 OPIOID DEPENDENCE, UNCOMPLICATED: ICD-10-CM

## 2022-12-07 LAB
ALBUMIN SERPL ELPH-MCNC: 3.6 G/DL — SIGNIFICANT CHANGE UP (ref 3.3–5)
ALP SERPL-CCNC: 44 U/L — SIGNIFICANT CHANGE UP (ref 40–120)
ALT FLD-CCNC: 28 U/L — SIGNIFICANT CHANGE UP (ref 4–41)
ANION GAP SERPL CALC-SCNC: 11 MMOL/L — SIGNIFICANT CHANGE UP (ref 7–14)
ANION GAP SERPL CALC-SCNC: 12 MMOL/L — SIGNIFICANT CHANGE UP (ref 7–14)
AST SERPL-CCNC: 39 U/L — SIGNIFICANT CHANGE UP (ref 4–40)
BASOPHILS # BLD AUTO: 0.03 K/UL — SIGNIFICANT CHANGE UP (ref 0–0.2)
BASOPHILS NFR BLD AUTO: 0.6 % — SIGNIFICANT CHANGE UP (ref 0–2)
BILIRUB SERPL-MCNC: 0.6 MG/DL — SIGNIFICANT CHANGE UP (ref 0.2–1.2)
BUN SERPL-MCNC: 23 MG/DL — SIGNIFICANT CHANGE UP (ref 7–23)
BUN SERPL-MCNC: 28 MG/DL — HIGH (ref 7–23)
CALCIUM SERPL-MCNC: 8.4 MG/DL — SIGNIFICANT CHANGE UP (ref 8.4–10.5)
CALCIUM SERPL-MCNC: 8.8 MG/DL — SIGNIFICANT CHANGE UP (ref 8.4–10.5)
CHLORIDE SERPL-SCNC: 103 MMOL/L — SIGNIFICANT CHANGE UP (ref 98–107)
CHLORIDE SERPL-SCNC: 98 MMOL/L — SIGNIFICANT CHANGE UP (ref 98–107)
CO2 SERPL-SCNC: 24 MMOL/L — SIGNIFICANT CHANGE UP (ref 22–31)
CO2 SERPL-SCNC: 26 MMOL/L — SIGNIFICANT CHANGE UP (ref 22–31)
CREAT SERPL-MCNC: 1.25 MG/DL — SIGNIFICANT CHANGE UP (ref 0.5–1.3)
CREAT SERPL-MCNC: 1.26 MG/DL — SIGNIFICANT CHANGE UP (ref 0.5–1.3)
EGFR: 59 ML/MIN/1.73M2 — LOW
EGFR: 59 ML/MIN/1.73M2 — LOW
EOSINOPHIL # BLD AUTO: 0.09 K/UL — SIGNIFICANT CHANGE UP (ref 0–0.5)
EOSINOPHIL NFR BLD AUTO: 1.7 % — SIGNIFICANT CHANGE UP (ref 0–6)
GLUCOSE SERPL-MCNC: 103 MG/DL — HIGH (ref 70–99)
GLUCOSE SERPL-MCNC: 99 MG/DL — SIGNIFICANT CHANGE UP (ref 70–99)
HCT VFR BLD CALC: 36.9 % — LOW (ref 39–50)
HCV AB S/CO SERPL IA: 14.23 S/CO — HIGH (ref 0–0.99)
HCV AB SERPL-IMP: REACTIVE
HCV RNA FLD QL NAA+PROBE: SIGNIFICANT CHANGE UP
HCV RNA SPEC QL PROBE+SIG AMP: SIGNIFICANT CHANGE UP
HGB BLD-MCNC: 12.3 G/DL — LOW (ref 13–17)
IANC: 3.35 K/UL — SIGNIFICANT CHANGE UP (ref 1.8–7.4)
IMM GRANULOCYTES NFR BLD AUTO: 0.4 % — SIGNIFICANT CHANGE UP (ref 0–0.9)
LYMPHOCYTES # BLD AUTO: 1.3 K/UL — SIGNIFICANT CHANGE UP (ref 1–3.3)
LYMPHOCYTES # BLD AUTO: 24.7 % — SIGNIFICANT CHANGE UP (ref 13–44)
MAGNESIUM SERPL-MCNC: 1.8 MG/DL — SIGNIFICANT CHANGE UP (ref 1.6–2.6)
MAGNESIUM SERPL-MCNC: 1.8 MG/DL — SIGNIFICANT CHANGE UP (ref 1.6–2.6)
MCHC RBC-ENTMCNC: 31.3 PG — SIGNIFICANT CHANGE UP (ref 27–34)
MCHC RBC-ENTMCNC: 33.3 GM/DL — SIGNIFICANT CHANGE UP (ref 32–36)
MCV RBC AUTO: 93.9 FL — SIGNIFICANT CHANGE UP (ref 80–100)
MONOCYTES # BLD AUTO: 0.48 K/UL — SIGNIFICANT CHANGE UP (ref 0–0.9)
MONOCYTES NFR BLD AUTO: 9.1 % — SIGNIFICANT CHANGE UP (ref 2–14)
NEUTROPHILS # BLD AUTO: 3.35 K/UL — SIGNIFICANT CHANGE UP (ref 1.8–7.4)
NEUTROPHILS NFR BLD AUTO: 63.5 % — SIGNIFICANT CHANGE UP (ref 43–77)
NRBC # BLD: 0 /100 WBCS — SIGNIFICANT CHANGE UP (ref 0–0)
NRBC # FLD: 0 K/UL — SIGNIFICANT CHANGE UP (ref 0–0)
PHOSPHATE SERPL-MCNC: 4.5 MG/DL — SIGNIFICANT CHANGE UP (ref 2.5–4.5)
PHOSPHATE SERPL-MCNC: 5.3 MG/DL — HIGH (ref 2.5–4.5)
PLATELET # BLD AUTO: 138 K/UL — LOW (ref 150–400)
POTASSIUM SERPL-MCNC: 3.4 MMOL/L — LOW (ref 3.5–5.3)
POTASSIUM SERPL-MCNC: 3.7 MMOL/L — SIGNIFICANT CHANGE UP (ref 3.5–5.3)
POTASSIUM SERPL-SCNC: 3.4 MMOL/L — LOW (ref 3.5–5.3)
POTASSIUM SERPL-SCNC: 3.7 MMOL/L — SIGNIFICANT CHANGE UP (ref 3.5–5.3)
PROT SERPL-MCNC: 6.1 G/DL — SIGNIFICANT CHANGE UP (ref 6–8.3)
RBC # BLD: 3.93 M/UL — LOW (ref 4.2–5.8)
RBC # FLD: 11.9 % — SIGNIFICANT CHANGE UP (ref 10.3–14.5)
SODIUM SERPL-SCNC: 135 MMOL/L — SIGNIFICANT CHANGE UP (ref 135–145)
SODIUM SERPL-SCNC: 139 MMOL/L — SIGNIFICANT CHANGE UP (ref 135–145)
TROPONIN T, HIGH SENSITIVITY RESULT: 44 NG/L — SIGNIFICANT CHANGE UP
WBC # BLD: 5.27 K/UL — SIGNIFICANT CHANGE UP (ref 3.8–10.5)
WBC # FLD AUTO: 5.27 K/UL — SIGNIFICANT CHANGE UP (ref 3.8–10.5)

## 2022-12-07 PROCEDURE — 93970 EXTREMITY STUDY: CPT | Mod: 26

## 2022-12-07 PROCEDURE — 93306 TTE W/DOPPLER COMPLETE: CPT | Mod: 26

## 2022-12-07 PROCEDURE — 99233 SBSQ HOSP IP/OBS HIGH 50: CPT

## 2022-12-07 PROCEDURE — 99232 SBSQ HOSP IP/OBS MODERATE 35: CPT | Mod: GC

## 2022-12-07 PROCEDURE — 90792 PSYCH DIAG EVAL W/MED SRVCS: CPT

## 2022-12-07 RX ORDER — QUETIAPINE FUMARATE 200 MG/1
25 TABLET, FILM COATED ORAL THREE TIMES A DAY
Refills: 0 | Status: DISCONTINUED | OUTPATIENT
Start: 2022-12-07 | End: 2022-12-08

## 2022-12-07 RX ORDER — QUETIAPINE FUMARATE 200 MG/1
12.5 TABLET, FILM COATED ORAL ONCE
Refills: 0 | Status: COMPLETED | OUTPATIENT
Start: 2022-12-07 | End: 2022-12-07

## 2022-12-07 RX ORDER — METHADONE HYDROCHLORIDE 40 MG/1
130 TABLET ORAL DAILY
Refills: 0 | Status: DISCONTINUED | OUTPATIENT
Start: 2022-12-07 | End: 2022-12-08

## 2022-12-07 RX ORDER — APIXABAN 2.5 MG/1
10 TABLET, FILM COATED ORAL EVERY 12 HOURS
Refills: 0 | Status: DISCONTINUED | OUTPATIENT
Start: 2022-12-07 | End: 2022-12-08

## 2022-12-07 RX ORDER — SODIUM CHLORIDE 9 MG/ML
1000 INJECTION INTRAMUSCULAR; INTRAVENOUS; SUBCUTANEOUS ONCE
Refills: 0 | Status: COMPLETED | OUTPATIENT
Start: 2022-12-07 | End: 2022-12-07

## 2022-12-07 RX ORDER — LANOLIN ALCOHOL/MO/W.PET/CERES
3 CREAM (GRAM) TOPICAL AT BEDTIME
Refills: 0 | Status: DISCONTINUED | OUTPATIENT
Start: 2022-12-07 | End: 2022-12-08

## 2022-12-07 RX ORDER — NIFEDIPINE 30 MG
30 TABLET, EXTENDED RELEASE 24 HR ORAL DAILY
Refills: 0 | Status: DISCONTINUED | OUTPATIENT
Start: 2022-12-07 | End: 2022-12-08

## 2022-12-07 RX ADMIN — APIXABAN 10 MILLIGRAM(S): 2.5 TABLET, FILM COATED ORAL at 13:54

## 2022-12-07 RX ADMIN — QUETIAPINE FUMARATE 12.5 MILLIGRAM(S): 200 TABLET, FILM COATED ORAL at 13:54

## 2022-12-07 RX ADMIN — Medication 30 MILLIGRAM(S): at 18:18

## 2022-12-07 RX ADMIN — METHADONE HYDROCHLORIDE 130 MILLIGRAM(S): 40 TABLET ORAL at 11:31

## 2022-12-07 RX ADMIN — SODIUM CHLORIDE 1000 MILLILITER(S): 9 INJECTION INTRAMUSCULAR; INTRAVENOUS; SUBCUTANEOUS at 02:54

## 2022-12-07 RX ADMIN — Medication 3 MILLIGRAM(S): at 21:06

## 2022-12-07 RX ADMIN — ENOXAPARIN SODIUM 40 MILLIGRAM(S): 100 INJECTION SUBCUTANEOUS at 08:42

## 2022-12-07 NOTE — PROGRESS NOTE ADULT - ASSESSMENT
77M with history of opioid use disorder, htn, with no known cardiac history presenting with AMS, confusion, anxiety, and elevated blood pressure in the setting of stopping recent psychiatric medications. Vitals significant for BP in the 180s-190s systolic, physical exam unremarkable, ekg showing Normal sinus rhythm without any abnormalities. On my exam he is AOx-2-3 (with prompting) and pleasant. He reports no recent cardiac symptoms except for acute worsening of his chronic lower extremity edema which I was able to appreciate on exam. Labs significant for a mild elevation in troponin.     #Hypertensive urgency  #Troponin elevation   -Troponin elevation likely 2/2 elevated blood pressures which can be seen in periods of acute agitation/untreated bp blood pressure and not ACS. Would trend to peak and obtain TTE   -Please start antihypertensive medications to Reduce BP to <160/100 over several  hrs; then to normal (<130/90) over 1-3d  -Can obtain lipid panel and hemoglobin a1c for risk stratification and comorbid disease assessment   -Will follow up echo

## 2022-12-07 NOTE — PROGRESS NOTE ADULT - PROBLEM SELECTOR PLAN 3
Pt on chronic methadone therapy, says on 133 mg daily  Refuses today's methadone dose  Will obtain records of methadone and dose per outpatient levels Pt w/ increased irritability, anxiety, forgetfulness, insomnia, hypertensive, tachycardic   Given hx of addiction disorder, pt may be in acute withdrawal   Alcohol level <10  Utox w/o evidence of substance use other than methadone  Will obtain HIV, Vitamin b12 level, folate level, RPR  TSH WNL  s/p CIWA, remained at 0, now d/c  Repleting electrolytes  BH consulted, recs appreciated  Seroquel PRN

## 2022-12-07 NOTE — SBIRT NOTE ADULT - NSSBIRTDRGPOSREINDET_GEN_A_CORE
Pt has history of heroin use, last used in 2013.  Pt is currently on Methadone and goes to [facility].  Pt denies use of any other substances.

## 2022-12-07 NOTE — PROGRESS NOTE ADULT - PROBLEM SELECTOR PLAN 5
LE edema for 2 weeks R>L, likely 2/2 DVT vs HF   Ordered LE Dopplers   Ordered TTE   f/u TSH Pt on chronic methadone therapy  Confirmed methadone dose with [facility] methadone clinic  Will c/w 130 daily, and pt will follow up for methadone management outpatient

## 2022-12-07 NOTE — PATIENT PROFILE ADULT - FALL HARM RISK - HARM RISK INTERVENTIONS
Assistance with ambulation/Assistance OOB with selected safe patient handling equipment/Communicate Risk of Fall with Harm to all staff/Monitor for mental status changes/Monitor gait and stability/Reinforce activity limits and safety measures with patient and family/Tailored Fall Risk Interventions/Toileting schedule using arm’s reach rule for commode and bathroom/Use of alarms - bed, chair and/or voice tab/Visual Cue: Yellow wristband and red socks/Bed in lowest position, wheels locked, appropriate side rails in place/Call bell, personal items and telephone in reach/Instruct patient to call for assistance before getting out of bed or chair/Non-slip footwear when patient is out of bed/[place] to call system/Physically safe environment - no spills, clutter or unnecessary equipment/Purposeful Proactive Rounding/Room/bathroom lighting operational, light cord in reach

## 2022-12-07 NOTE — BH CONSULTATION LIAISON ASSESSMENT NOTE - NSBHATTESTCOMMENTATTENDFT_PSY_A_CORE
met with the patient. Case discussed with medical student, impression and plan discussed and agreed upon.

## 2022-12-07 NOTE — BH CONSULTATION LIAISON ASSESSMENT NOTE - HPI (INCLUDE ILLNESS QUALITY, SEVERITY, DURATION, TIMING, CONTEXT, MODIFYING FACTORS, ASSOCIATED SIGNS AND SYMPTOMS)
Warner Dc is a 77-year-old man with a PMHx of HTN and polysubstance use (on Methadone) and prior psychiatric hospital admissions for opioid use disorder and withdrawal now presenting with confusion and found to have hypertensive urgency. Patient was unable to provide a complete history as he does not remember much about how he was doing before he came to the hospital. The last thing he remembers is being at his Methadone clinic with an NP, who called 911 for him. He was unsure why 911 was called. Currently patient endorses feeling depressed and that his "hope is diminishing", which he attributes to his situation of being dependent on his Methadone and Trazodone. He has been taking Methadone for many years (duration unknown to patient) due to prior heroin use, and he has been taking Trazodone for many years (duration unknown to patient) to help with his sleep difficulties. In addition to depressed mood and hopelessness, he also endorses loss of interest in his usual activities and feelings of excessive guilt due to how his situation has been impacting his wife. However, he denies difficulty with concentration/focus and significant changes in his energy or appetite. He additionally denies both suicidal and homicidal ideation/intent/plan. He reports that he does not see a psychiatrist or therapist and that he doesn't talk to anyone when he is feeling down.     Collateral information obtained from patient's wife Korey Dc on 12/7 ([phone]):  Patient and his wife have been  for 47 years, and he has been on Methadone for the entire duration of their marriage. Neither he nor his providers have made any efforts to wean himself off the Methadone, and he worries that he'll die if he stops taking it. Patient's wife goes with him every day to the Methadone clinic to acquire his dose. For an unknown number of years, patient has also been taking Trazodone to help with his sleep, but patient's wife believes that it is not helping his sleep at all and that he takes it "just to get high". She reports that he takes his medications to get high and then sits on the couch all day. He doesn't pay attention to anyone else except himself and his medications--he doesn't eat well or take care of himself. Patient's wife also reports that he recently started showing signs of dementia, but after 1 or 2 appointments he began refusing to go to the doctor for proper evaluation. She feels that his mindset is "my way or the highway" and has a bad temper.  Warner Dc is a 77-year-old man with a PMHx of HTN, has a psychiatric history notable for Polysubstance use--- no recent use- on Methadone for opioid use disorder, now presenting with confusion and found to have hypertensive urgency at the Methadone clinic on 12/6/2022. Psychiatry consulted for assessment of mood.    Patient was unable to provide a complete history as he does not remember much about how he was doing before he came to the hospital. The last thing he remembers is being at his Methadone clinic with an NP, who called 911 for him. He was unsure why 911 was called. Currently patient endorses feeling depressed and that his "hope is diminishing", which he attributes to his situation of being dependent on his Methadone and Trazodone. He has been taking Methadone for many years (duration unknown to patient) due to prior heroin use, and he has been taking Trazodone for many years (duration unknown to patient) to help with his sleep difficulties. In addition to depressed mood and hopelessness, he also endorses loss of interest in his usual activities and feelings of excessive guilt due to how his situation has been impacting his wife. However, he denies difficulty with concentration/focus or significant changes in his energy or appetite. He additionally denies both suicidal and homicidal ideation/intent/plan. No psychosis. He is oriented to year, and month. States that he lives at home with his wife and adult grand son. Cites them as a protective factor.     Collateral information obtained from patient's wife Korey Dc on 12/7 ([phone]):  Patient and his wife have been  for 47 years, and he has been on Methadone for the entire duration of their marriage. Neither he nor his providers have made any efforts to wean himself off the Methadone, and he worries that he'll die if he stops taking it. Patient's wife goes with him every day to the Methadone clinic to acquire his dose. For an unknown number of years, patient has also been taking Trazodone to help with his sleep, but patient's wife believes that it is not helping his sleep at all and that he takes it "just to get high". She reports that he takes his medications to get high and then sits on the couch all day. He doesn't pay attention to anyone else except himself and his medications--he doesn't eat well or take care of himself. Patient's wife also reports that he recently started showing signs of dementia, but after 1 or 2 appointments he began refusing to go to the doctor for proper evaluation. She feels that his mindset is "my way or the highway" and has a bad temper.   Dr Coe coordinated further with wife Korey--- states that she will ensure that patient sticks with the original plan of having medications including Methadone in a locked box and being administered by wife. Wife states that she wants his psychiatric provider to attempt to lower dose of Methadone as an outpatient. Wife states that while she is concerned about above, she is not concerned for his safety as re: si or hi. No access to guns or weapons. History of polysubstance abuse, but no concerns for recent drug or alcohol relapse.   Reviewed utox results.

## 2022-12-07 NOTE — PROGRESS NOTE ADULT - PROBLEM SELECTOR PLAN 1
Pt w/ SBPs in 200s with mild troponemia  Hypertensive urgency likely 2/2 acute withdrawal of unknown substance   Cardiology consulted, recs appreciated  Will give 100mg labetalol and re-evaluate BP in 1 hr   Will plan to reduced BP to ~160/100 over several  hrs; then to normal (<130/90) over 1-3d  Order TTE  Telemetry monitoring Pt w/ SBPs in 200s with mild troponemia  Hypertensive urgency likely 2/2 acute withdrawal of unknown substance   Cardiology consulted, recs appreciated  s/p 100mg labetalol, with BP now in 160s.   Will plan to normalize BP (<130/90) over 1-3d  F/u TTE  Will d/c pt with antihypertensive medication

## 2022-12-07 NOTE — PROGRESS NOTE ADULT - SUBJECTIVE AND OBJECTIVE BOX
North Garza MD  Internal Medicine, PGY1  Universal pager: [phone] / [facility]: [phone]      Patient is a 77y old  Male who presents with a chief complaint of AMS (06 Dec 2022 17:30)    OVERNIGHT EVENTS: NAEON    SUBJECTIVE:  - denies F/C, lightheadedness, HA, CP, SOB, abd pain, N/V/D/C, burning w/ urination, leg swelling    focused ROS as above    MEDICATIONS  (STANDING):  enoxaparin Injectable 40 milliGRAM(s) SubCutaneous every 24 hours    MEDICATIONS  (PRN):  acetaminophen     Tablet .. 650 milliGRAM(s) Oral every 6 hours PRN Temp greater or equal to 38C (100.4F), Mild Pain (1 - 3)  LORazepam     Tablet 1 milliGRAM(s) Oral every 2 hours PRN CIWA-Ar score increase by 2 points and a total score of 7 or less  LORazepam     Tablet 1 milliGRAM(s) Oral every 1 hour PRN CIWA-Ar score 8 or greater      OBJECTIVE:  T(C): 36.7 (22 @ 04:00), Max: 37.7 (22 @ 09:47)  HR: 72 (22 @ 05:00) (64 - 78)  BP: 168/66 (22 @ 05:00) (143/62 - 203/109)  RR: 18 (22 @ 04:00) (16 - 20)  SpO2: 97% (22 @ 04:00) (96% - 100%)    GENERAL: Restless, fidgety.   HEAD:  Atraumatic, Normocephalic  ENT: EOMI, PERRLA, conjunctiva and sclera clear, Neck supple, No JVD, moist mucosa  CHEST/LUNG: Clear to auscultation bilaterally; No wheeze, equal breath sounds bilaterally   BACK: No spinal tenderness  HEART: Systolic ejection murmur right sternal border   ABDOMEN: Soft, Nontender, Nondistended; Bowel sounds present  EXTREMITIES: 1+ Pitting edema R>L,  No clubbing, cyanosis,    PSYCH: Restless, fidgety, normal affect   NEUROLOGY: AAOx3, non-focal, cranial nerves intact  SKIN: Normal color, No rashes or lesions    LABS:  CAPILLARY BLOOD GLUCOSE        I&O's Summary    06 Dec 2022 07:01  -  07 Dec 2022 07:00  --------------------------------------------------------  IN: 0 mL / OUT: 300 mL / NET: -300 mL                            12.3   5.27  )-----------( 138      ( 07 Dec 2022 05:42 )             36.9         139  |  103  |  x   ----------------------------<  x   3.7   |  x   |  x     Ca    8.8      07 Dec 2022 00:46  Phos  4.5       Mg     1.80         TPro  7.7  /  Alb  4.9  /  TBili  1.0  /  DBili  x   /  AST  49<H>  /  ALT  26  /  AlkPhos  55  12-06    PT/INR - ( 06 Dec 2022 09:30 )   PT: 14.0 sec;   INR: 1.20 ratio         PTT - ( 06 Dec 2022 09:30 )  PTT:28.9 sec      Urinalysis Basic - ( 06 Dec 2022 21:35 )    Color: Yellow / Appearance: Clear / S.019 / pH: x  Gluc: x / Ketone: Moderate  / Bili: Negative / Urobili: <2 mg/dL   Blood: x / Protein: Trace / Nitrite: Negative   Leuk Esterase: Negative / RBC: x / WBC x   Sq Epi: x / Non Sq Epi: x / Bacteria: x        Microbiology:      RADIOLOGY & ADDITIONAL TESTS:    Imaging Personally Reviewed:  Consultant(s) Notes Reviewed:    Care Discussed with Consultants/Other Providers: North Garza MD  Internal Medicine, PGY1  Universal pager: [phone] / [facility]: [phone]      Patient is a 77y old  Male who presents with a chief complaint of AMS (06 Dec 2022 17:30)    OVERNIGHT EVENTS: No acute events ON.     focused ROS as above    MEDICATIONS  (STANDING):  enoxaparin Injectable 40 milliGRAM(s) SubCutaneous every 24 hours    MEDICATIONS  (PRN):  acetaminophen     Tablet .. 650 milliGRAM(s) Oral every 6 hours PRN Temp greater or equal to 38C (100.4F), Mild Pain (1 - 3)  LORazepam     Tablet 1 milliGRAM(s) Oral every 2 hours PRN CIWA-Ar score increase by 2 points and a total score of 7 or less  LORazepam     Tablet 1 milliGRAM(s) Oral every 1 hour PRN CIWA-Ar score 8 or greater      OBJECTIVE:  T(C): 36.7 (22 @ 04:00), Max: 37.7 (22 @ 09:47)  HR: 72 (22 @ 05:00) (64 - 78)  BP: 168/66 (22 @ 05:00) (143/62 - 203/109)  RR: 18 (22 @ 04:00) (16 - 20)  SpO2: 97% (22 @ 04:00) (96% - 100%)    GENERAL: Restless, fidgety.   HEAD:  Atraumatic, Normocephalic  ENT: EOMI, PERRLA, conjunctiva and sclera clear, Neck supple, No JVD, moist mucosa  CHEST/LUNG: Clear to auscultation bilaterally; No wheeze, equal breath sounds bilaterally   BACK: No spinal tenderness  HEART: Systolic ejection murmur right sternal border   ABDOMEN: Soft, Nontender, Nondistended; Bowel sounds present  EXTREMITIES: 1+ Pitting edema R>L,  No clubbing, cyanosis,    PSYCH: Restless, fidgety, normal affect   NEUROLOGY: AAOx3, non-focal, cranial nerves intact  SKIN: Normal color, No rashes or lesions    LABS:  CAPILLARY BLOOD GLUCOSE      I&O's Summary    06 Dec 2022 07:01  -  07 Dec 2022 07:00  --------------------------------------------------------  IN: 0 mL / OUT: 300 mL / NET: -300 mL                            12.3   5.27  )-----------( 138      ( 07 Dec 2022 05:42 )             36.9     12    139  |  103  |  x   ----------------------------<  x   3.7   |  x   |  x     Ca    8.8      07 Dec 2022 00:46  Phos  4.5     12-  Mg     1.80         TPro  7.7  /  Alb  4.9  /  TBili  1.0  /  DBili  x   /  AST  49<H>  /  ALT  26  /  AlkPhos  55  12-    PT/INR - ( 06 Dec 2022 09:30 )   PT: 14.0 sec;   INR: 1.20 ratio         PTT - ( 06 Dec 2022 09:30 )  PTT:28.9 sec      Urinalysis Basic - ( 06 Dec 2022 21:35 )    Color: Yellow / Appearance: Clear / S.019 / pH: x  Gluc: x / Ketone: Moderate  / Bili: Negative / Urobili: <2 mg/dL   Blood: x / Protein: Trace / Nitrite: Negative   Leuk Esterase: Negative / RBC: x / WBC x   Sq Epi: x / Non Sq Epi: x / Bacteria: x        Microbiology:      RADIOLOGY & ADDITIONAL TESTS:    Imaging Personally Reviewed:  Consultant(s) Notes Reviewed:    Care Discussed with Consultants/Other Providers: North Garza MD  Internal Medicine, PGY1  Universal pager: [phone] / [facility]: [phone]      Patient is a 77y old  Male who presents with a chief complaint of AMS (06 Dec 2022 17:30)    OVERNIGHT EVENTS: No acute events ON.     focused ROS as above    MEDICATIONS  (STANDING):  enoxaparin Injectable 40 milliGRAM(s) SubCutaneous every 24 hours    MEDICATIONS  (PRN):  acetaminophen     Tablet .. 650 milliGRAM(s) Oral every 6 hours PRN Temp greater or equal to 38C (100.4F), Mild Pain (1 - 3)  LORazepam     Tablet 1 milliGRAM(s) Oral every 2 hours PRN CIWA-Ar score increase by 2 points and a total score of 7 or less  LORazepam     Tablet 1 milliGRAM(s) Oral every 1 hour PRN CIWA-Ar score 8 or greater      OBJECTIVE:  T(C): 36.7 (22 @ 04:00), Max: 37.7 (22 @ 09:47)  HR: 72 (22 @ 05:00) (64 - 78)  BP: 168/66 (22 @ 05:00) (143/62 - 203/109)  RR: 18 (22 @ 04:00) (16 - 20)  SpO2: 97% (22 @ 04:00) (96% - 100%)    GENERAL: Restless, fidgety.   HEAD:  Atraumatic, Normocephalic  ENT: EOMI, PERRLA, conjunctiva and sclera clear, Neck supple, No JVD, moist mucosa  CHEST/LUNG: Clear to auscultation bilaterally; No wheeze, equal breath sounds bilaterally   BACK: No spinal tenderness  HEART: Systolic ejection murmur right sternal border   ABDOMEN: Soft, Nontender, Nondistended; Bowel sounds present  EXTREMITIES: 1+ Pitting edema R>L,  No clubbing, cyanosis,    PSYCH: Restless, fidgety, normal affect   NEUROLOGY: AAOx3, non-focal, cranial nerves intact  SKIN: Normal color, No rashes or lesions    LABS:  CAPILLARY BLOOD GLUCOSE      I&O's Summary    06 Dec 2022 07:01  -  07 Dec 2022 07:00  --------------------------------------------------------  IN: 0 mL / OUT: 300 mL / NET: -300 mL                            12.3   5.27  )-----------( 138      ( 07 Dec 2022 05:42 )             36.9     12    139  |  103  |  x   ----------------------------<  x   3.7   |  x   |  x     Ca    8.8      07 Dec 2022 00:46  Phos  4.5     12-  Mg     1.80         TPro  7.7  /  Alb  4.9  /  TBili  1.0  /  DBili  x   /  AST  49<H>  /  ALT  26  /  AlkPhos  55  12-    PT/INR - ( 06 Dec 2022 09:30 )   PT: 14.0 sec;   INR: 1.20 ratio         PTT - ( 06 Dec 2022 09:30 )  PTT:28.9 sec      Urinalysis Basic - ( 06 Dec 2022 21:35 )    Color: Yellow / Appearance: Clear / S.019 / pH: x  Gluc: x / Ketone: Moderate  / Bili: Negative / Urobili: <2 mg/dL   Blood: x / Protein: Trace / Nitrite: Negative   Leuk Esterase: Negative / RBC: x / WBC x   Sq Epi: x / Non Sq Epi: x / Bacteria: x        Microbiology:      RADIOLOGY & ADDITIONAL TESTS:    Imaging Personally Reviewed:  Consultant(s) Notes Reviewed:    Care Discussed with Consultants/Other Providers:

## 2022-12-07 NOTE — PROGRESS NOTE ADULT - PROBLEM SELECTOR PLAN 6
Diet: regular  DVT: heparin   Dispo: home Pt w/ elevated troponins 57->42, likely iso hypertensive urgency  EKG NSR, non-concerning for acute cardiac event   pro-, less likely acute heart failure  Troponins downtrended   Pt asymptomatic   A1c cholesterol WNL   Pending TTE

## 2022-12-07 NOTE — PROGRESS NOTE ADULT - PROBLEM SELECTOR PLAN 4
Pt w/ elevated troponins 57->42, likely iso hypertensive urgency  EKG NSR, non-concerning for acute cardiac event   pro-, less likely acute heart failure  Pt asymptomatic   Will place on telemetry   TTE  Obtain A1c, lipid profile for risk evaluation Pt positive for Hep C with virus S/CO ration 14  Per wife, pt was previously on treatment, unsure on follow up   Will email GI

## 2022-12-07 NOTE — BH CONSULTATION LIAISON ASSESSMENT NOTE - NSBHCHARTREVIEWVS_PSY_A_CORE FT
Vital Signs Last 24 Hrs  T(C): 36.2 (07 Dec 2022 12:00), Max: 37.1 (06 Dec 2022 20:00)  T(F): 97.2 (07 Dec 2022 12:00), Max: 98.8 (06 Dec 2022 20:00)  HR: 59 (07 Dec 2022 12:00) (59 - 78)  BP: 161/84 (07 Dec 2022 12:00) (143/62 - 184/90)  BP(mean): --  RR: 17 (07 Dec 2022 12:00) (16 - 18)  SpO2: 95% (07 Dec 2022 12:00) (95% - 100%)    Parameters below as of 07 Dec 2022 12:00  Patient On (Oxygen Delivery Method): room air

## 2022-12-07 NOTE — PROGRESS NOTE ADULT - ASSESSMENT
78 y/o M w/ PMH heroin use disorder now on methadone, unmanaged HTN, presents with 4 days of AMS, found to be in hypertensive urgency.  76 y/o M w/ PMH heroin use disorder now on methadone, unmanaged HTN, presents with 4 days of AMS and LE edema, found to be in hypertensive urgency and with LLE proximal DVT.

## 2022-12-07 NOTE — BH CONSULTATION LIAISON ASSESSMENT NOTE - NSBHCHARTREVIEWLAB_PSY_A_CORE FT
139  |  103  |  23  ----------------------------<  99  3.7   |  24  |  1.25    Ca    8.4      07 Dec 2022 05:42  Phos  4.5       Mg     1.80         TPro  6.1  /  Alb  3.6  /  TBili  0.6  /  DBili  x   /  AST  39  /  ALT  28  /  AlkPhos  44      CBC:             12.3   5.27  )-----------( 138      ( 07 Dec 2022 05:42 )             36.9     Urinalysis Basic - ( 06 Dec 2022 21:35 )    Color: Yellow / Appearance: Clear / S.019 / pH: x  Gluc: x / Ketone: Moderate  / Bili: Negative / Urobili: <2 mg/dL   Blood: x / Protein: Trace / Nitrite: Negative   Leuk Esterase: Negative / RBC: x / WBC x   Sq Epi: x / Non Sq Epi: x / Bacteria: x    Drug Screen W/PCP, Urine (12.22 @ 21:35)   Drug Screen W/PCP, Urine: Done   Benzodiazepine, Urine (. @ 21:35)   Benzodiazepine, Urine: Negative   Cocaine Metabolite, Urine (..22 @ 21:35)   Cocaine Metabolite, Urine: Negative   Amphetamine, Urine (12..22 @ 21:35)   Amphetamine, Urine: Negative   Opiate, Urine (12.. @ 21:35)   Opiate, Urine: Negative   Barbiturates Screen, Urine (. @ 21:35)   Barbiturates Screen, Urine: Negative   Phencyclidine Level, Urine (12..22 @ 21:35)   Phencyclidine Level, Urine: Negative   Methadone, Urine (12.. @ 21:35)   Methadone, Urine: Positive   Oxycodone, Urine (. @ 21:35)   Oxycodone, Urine: Negative  THC, Urine Qualitative (. @ 21:35)   THC, Urine Qualitative: Negative

## 2022-12-07 NOTE — BH CONSULTATION LIAISON ASSESSMENT NOTE - CURRENT MEDICATION
MEDICATIONS  (STANDING):  enoxaparin Injectable 40 milliGRAM(s) SubCutaneous every 24 hours  methadone    Tablet 130 milliGRAM(s) Oral daily    MEDICATIONS  (PRN):  acetaminophen     Tablet .. 650 milliGRAM(s) Oral every 6 hours PRN Temp greater or equal to 38C (100.4F), Mild Pain (1 - 3)  LORazepam     Tablet 1 milliGRAM(s) Oral every 2 hours PRN CIWA-Ar score increase by 2 points and a total score of 7 or less  LORazepam     Tablet 1 milliGRAM(s) Oral every 1 hour PRN CIWA-Ar score 8 or greater

## 2022-12-07 NOTE — BH CONSULTATION LIAISON ASSESSMENT NOTE - NSICDXPASTMEDICALHX_GEN_ALL_CORE_FT
PAST MEDICAL HISTORY:  Drug abuse and dependence Former heroin abuser, on methadone. Last use in 2013, on methadone for 45 years. Followed at [facility] methadone clinic.    Peripheral neuropathy

## 2022-12-07 NOTE — BH CONSULTATION LIAISON ASSESSMENT NOTE - NSSUICRSKFACTOROTHER_PSY_ALL_CORE
[address] [place]. [place], [address]  Tel.  [phone]  Fax. [address] [facility] [address]  [place], [address]  Tel.  [phone]  Fax. [phone] [address]  Tel.  [phone]  Fax. [phone]     Sleep Apnea: After Your Visit  Your Care Instructions  Sleep apnea occurs when you frequently stop breathing for 10 seconds or longer during sleep. It can be mild to severe, based on the number of times per hour that you stop breathing or have slowed breathing. Blocked or narrowed airways in your nose, mouth, or throat can cause sleep apnea. Your airway can become blocked when your throat muscles and tongue relax during sleep. Sleep apnea is common, occurring in 1 out of 20 individuals. Individuals having any of the following characteristics should be evaluated and treated right away due to high risk and detrimental consequences from untreated sleep apnea:  1. Obesity  2. Congestive Heart failure  3. Atrial Fibrillation  4. Uncontrolled Hypertension  5. Type II Diabetes  6. Night-time Arrhythmias  7. Stroke  8. Pulmonary Hypertension  9. High-risk Driving Populations (pilots, truck drivers, etc.)  10. Patients Considering Weight-loss Surgery    How do you know you have sleep apnea? You probably have sleep apnea if you answer 'yes' to 3 or more of the following questions:  S - Have you been told that you Snore? T - Are you often Tired during the day? O - Has anyone Observed you stop breathing while sleeping? P- Do you have (or are being treated for) high blood Pressure? B - Are you obese (Body Mass Index > 35)? A - Is your Age 48years old or older? N - Is your Neck size greater than 16 inches? G - Are you male Gender? A sleep physician can prescribe a breathing device that prevents tissues in the throat from blocking your airway.  Or your doctor may recommend using a dental device (oral breathing device) to help keep your airway open. In some cases, surgery may be needed to remove enlarged tissues in the throat. Follow-up care is a key part of your treatment and safety. Be sure to make and go to all appointments, and call your doctor if you are having problems. It's also a good idea to know your test results and keep a list of the medicines you take. How can you care for yourself at home? · Lose weight, if needed. It may reduce the number of times you stop breathing or have slowed breathing. · Go to bed at the same time every night. · Sleep on your side. It may stop mild apnea. If you tend to roll onto your back, sew a pocket in the back of your pajama top. Put a tennis ball into the pocket, and stitch the pocket shut. This will help keep you from sleeping on your back. · Avoid alcohol and medicines such as sleeping pills and sedatives before bed. · Do not smoke. Smoking can make sleep apnea worse. If you need help quitting, talk to your doctor about stop-smoking programs and medicines. These can increase your chances of quitting for good. · Prop up the head of your bed 4 to 6 inches by putting bricks under the legs of the bed. · Treat breathing problems, such as a stuffy nose, caused by a cold or allergies. · Use a continuous positive airway pressure (CPAP) breathing machine if lifestyle changes do not help your apnea and your doctor recommends it. The machine keeps your airway from closing when you sleep. · If CPAP does not help you, ask your doctor whether you should try other breathing machines. A bilevel positive airway pressure machine has two types of air pressureâone for breathing in and one for breathing out. Another device raises or lowers air pressure as needed while you breathe. · If your nose feels dry or bleeds when using one of these machines, talk with your doctor about increasing moisture in the air. A humidifier may help.   · If your nose is runny or stuffy from using a breathing machine, talk with your doctor about using decongestants or a corticosteroid nasal spray. When should you call for help? Watch closely for changes in your health, and be sure to contact your doctor if:  · You still have sleep apnea even though you have made lifestyle changes. · You are thinking of trying a device such as CPAP. · You are having problems using a CPAP or similar machine. Where can you learn more? Go to [employer]. Enter X140 in the search box to learn more about \"Sleep Apnea: After Your Visit. \"   © 0554-4552 Healthwise, Incorporated. Care instructions adapted under license by Lyndon Gutierrez (which disclaims liability or warranty for this information). This care instruction is for use with your licensed healthcare professional. If you have questions about a medical condition or this instruction, always ask your healthcare professional. Lianne Enciso any warranty or liability for your use of this information. PROPER SLEEP HYGIENE    What to avoid  · Do not have drinks with caffeine, such as coffee or black tea, for 8 hours before bed. · Do not smoke or use other types of tobacco near bedtime. Nicotine is a stimulant and can keep you awake. · Avoid drinking alcohol late in the evening, because it can cause you to wake in the middle of the night. · Do not eat a big meal close to bedtime. If you are hungry, eat a light snack. · Do not drink a lot of water close to bedtime, because the need to urinate may wake you up during the night. · Do not read or watch TV in bed. Use the bed only for sleeping and sexual activity. What to try  · Go to bed at the same time every night, and wake up at the same time every morning. Do not take naps during the day. · Keep your bedroom quiet, dark, and cool. · Get regular exercise, but not within 3 to 4 hours of your bedtime. .  · Sleep on a comfortable pillow and mattress.   · If watching the clock makes you anxious, turn it facing away from you so you cannot see the time. · If you worry when you lie down, start a worry book. Well before bedtime, write down your worries, and then set the book and your concerns aside. · Try meditation or other relaxation techniques before you go to bed. · If you cannot fall asleep, get up and go to another room until you feel sleepy. Do something relaxing. Repeat your bedtime routine before you go to bed again. · Make your house quiet and calm about an hour before bedtime. Turn down the lights, turn off the TV, log off the computer, and turn down the volume on music. This can help you relax after a busy day. Drowsy Driving  The [address] [place] [address] Road Traffic Safety Administration cites drowsiness as a causing factor in more than 674,269 police reported crashes annually, resulting in 76,000 injuries and 1,500 deaths. Other surveys suggest 55% of people polled have driven while drowsy in the past year, 23% had fallen asleep but not crashed, 3% crashed, and 2% had and accident due to drowsy driving. Who is at risk? Young Drivers: One study of drowsy driving accidents states that 55% of the drivers were under 25 years. Of those, 75% were male. Shift Workers and Travelers: People who work overnight or travel across time zones frequently are at higher risk of experiencing Circadian Rhythm Disorders. They are trying to work and function when their body is programed to sleep. Sleep Deprived: Lack of sleep has a serious impact on your ability to pay attention or focus on a task. Consistently getting less than the average of 8 hours your body needs creates partial or cumulative sleep deprivation. Untreated Sleep Disorders: Sleep Apnea, Narcolepsy, R.L.S., and other sleep disorders (untreated) prevent a person from getting enough restful sleep. This leads to excessive daytime sleepiness and increases the risk for drowsy driving accidents by up to 7 times.   Medications / Alcohol: Even over the counter medications can cause drowsiness. Medications that impair a drivers attention should have a warning label. Alcohol naturally makes you sleepy and on its own can cause accidents. Combined with excessive drowsiness its effects are amplified. Signs of Drowsy Driving:   * You don't remember driving the last few miles   * You may drift out of your patricia   * You are unable to focus and your thoughts wander   * You may yawn more often than normal   * You have difficulty keeping your eyes open / nodding off   * Missing traffic signs, speeding, or tailgating  Prevention-   Good sleep hygiene, lifestyle and behavioral choices have the most impact on drowsy driving. There is no substitute for sleep and the average person requires 8 hours nightly. If you find yourself driving drowsy, stop and sleep. Consider the sleep hygiene tips provided during your visit as well. Medication Refill Policy: Refills for all medications require 1 week advance notice. Please have your pharmacy fax a refill request. We are unable to fax, or call in \"controled substance\" medications and you will need to pick these prescriptions up from our office. [employer] Activation    Thank you for requesting access to [employer]. Please follow the instructions below to securely access and download your online medical record. [employer] allows you to send messages to your doctor, view your test results, renew your prescriptions, schedule appointments, and more. How Do I Sign Up? 1. In your internet browser, go to [email or web address]. 2. Click on the First Time User? Click Here link in the Sign In box. You will see the New Member Sign Up page. 3. Enter your [employer] Access Code exactly as it appears below. You will not need to use this code after youve completed the sign-up process. If you do not sign up before the expiration date, you must request a new code. [employer] Access Code:  Activation code not generated  Current [employer] Status: Active (This is the date your [employer] access code will )    4. Enter the last four digits of your Social Security Number (xxxx) and Date of Birth (mm/dd/yyyy) as indicated and click Submit. You will be taken to the next sign-up page. 5. Create a [employer] ID. This will be your [employer] login ID and cannot be changed, so think of one that is secure and easy to remember. 6. Create a [employer] password. You can change your password at any time. 7. Enter your Password Reset Question and Answer. This can be used at a later time if you forget your password. 8. Enter your e-mail address. You will receive e-mail notification when new information is available in [address] 9. Click Sign Up. You can now view and download portions of your medical record. 10. Click the Download Summary menu link to download a portable copy of your medical information. Additional Information    If you have questions, please call [phone]. Remember, [employer] is NOT to be used for urgent needs. For medical emergencies, dial 911. History of polysubstance use, on Methadone

## 2022-12-07 NOTE — PROGRESS NOTE ADULT - PROBLEM SELECTOR PLAN 2
Pt w/ increased irritability, anxiety, forgetfulness, insomnia, hypertensive, tachycardic   Given hx of addiction disorder, pt may be in acute withdrawal   Alcohol level <10  Will obtain Utox, HIV   Will place on CIWA protocol  Repleting electrolytes  BH LE edema for 2 weeks R>L, likely 2/2 DVT vs HF   Ordered LE Dopplers, w/ findings of L CVT in the left proximal and mid femoral vein. Deep venous insufficiency noted in right femoral and popliteal veins.    > Will start Eliquis 10BID for 7 days, then switch to 5mg BID

## 2022-12-07 NOTE — BH CONSULTATION LIAISON ASSESSMENT NOTE - RISK ASSESSMENT
chronic risk factors not in imminent risk to intentionally hurt self or others--- older male, cognitive decline, polysubstance abuse, delirium,   today has some depressive symptoms, but denies any si or hi, no psychosis, wife has no psychiatric safety concerns, no access to guns or weapons.  Is engaged in treatment at [facility]

## 2022-12-07 NOTE — PROGRESS NOTE ADULT - SUBJECTIVE AND OBJECTIVE BOX
Kevon Rubio MD  Cardiology Fellow  [phone]  All Cardiology service information can be found  on [email or web address], password: [email or web address]    Patient seen and examined at bedside.    Overnight Events:   NAEON     Review Of Systems: No chest pain, shortness of breath, or palpitations            Current Meds:  acetaminophen     Tablet .. 650 milliGRAM(s) Oral every 6 hours PRN  enoxaparin Injectable 40 milliGRAM(s) SubCutaneous every 24 hours  LORazepam     Tablet 1 milliGRAM(s) Oral every 2 hours PRN  LORazepam     Tablet 1 milliGRAM(s) Oral every 1 hour PRN  methadone    Tablet 130 milliGRAM(s) Oral daily      Vitals:  T(F): 97.4 (), Max: 98.8 ()  HR: 62 () (62 - 78)  BP: 164/73 () (143/62 - 184/90)  RR: 18 ()  SpO2: 98% ()  I&O's Summary    06 Dec 2022 07:01  -  07 Dec 2022 07:00  --------------------------------------------------------  IN: 0 mL / OUT: 300 mL / NET: -300 mL        Physical Exam:  Appearance: No acute distress; well appearing  Eyes: PERRL, EOMI, pink conjunctiva  HEENT: Normal oral mucosa  Cardiovascular: RRR, S1, S2, no murmurs, rubs, or gallops; no edema; no JVD  Respiratory: Clear to auscultation bilaterally  Gastrointestinal: soft, non-tender, non-distended with normal bowel sounds  Musculoskeletal: No clubbing; no joint deformity   Neurologic: Non-focal  Lymphatic: No lymphadenopathy  Psychiatry: AAOx1-2, mood & affect appropriate  Skin: No rashes, ecchymoses, or cyanosis                          12.3   5.27  )-----------( 138      ( 07 Dec 2022 05:42 )             36.9         139  |  103  |  23  ----------------------------<  99  3.7   |  24  |  1.25    Ca    8.4      07 Dec 2022 05:42  Phos  4.5     12  Mg     1.80         TPro  6.1  /  Alb  3.6  /  TBili  0.6  /  DBili  x   /  AST  39  /  ALT  28  /  AlkPhos  44  12-    PT/INR - ( 06 Dec 2022 09:30 )   PT: 14.0 sec;   INR: 1.20 ratio         PTT - ( 06 Dec 2022 09:30 )  PTT:28.9 sec  CARDIAC MARKERS ( 07 Dec 2022 05:42 )  44 ng/L / x     / x     / x     / x     / x      CARDIAC MARKERS ( 06 Dec 2022 17:45 )  44 ng/L / x     / x     / x     / x     / x      CARDIAC MARKERS ( 06 Dec 2022 12:43 )  42 ng/L / x     / x     / x     / x     / x      CARDIAC MARKERS ( 06 Dec 2022 09:30 )  57 ng/L / x     / x     / x     / x     / x          Serum Pro-Brain Natriuretic Peptide: 194 pg/mL ( @ 09:30)    Total Cholesterol: 116  LDL: --  HDL: 41  T        New ECG(s): Personally reviewed

## 2022-12-08 ENCOUNTER — TRANSCRIPTION ENCOUNTER (OUTPATIENT)
Age: 77
End: 2022-12-08

## 2022-12-08 VITALS
HEART RATE: 78 BPM | TEMPERATURE: 97 F | DIASTOLIC BLOOD PRESSURE: 76 MMHG | OXYGEN SATURATION: 100 % | RESPIRATION RATE: 18 BRPM | SYSTOLIC BLOOD PRESSURE: 127 MMHG

## 2022-12-08 LAB
ANION GAP SERPL CALC-SCNC: 11 MMOL/L — SIGNIFICANT CHANGE UP (ref 7–14)
BASOPHILS # BLD AUTO: 0.04 K/UL — SIGNIFICANT CHANGE UP (ref 0–0.2)
BASOPHILS NFR BLD AUTO: 0.9 % — SIGNIFICANT CHANGE UP (ref 0–2)
BUN SERPL-MCNC: 19 MG/DL — SIGNIFICANT CHANGE UP (ref 7–23)
CALCIUM SERPL-MCNC: 9.1 MG/DL — SIGNIFICANT CHANGE UP (ref 8.4–10.5)
CHLORIDE SERPL-SCNC: 101 MMOL/L — SIGNIFICANT CHANGE UP (ref 98–107)
CO2 SERPL-SCNC: 26 MMOL/L — SIGNIFICANT CHANGE UP (ref 22–31)
CREAT SERPL-MCNC: 1.22 MG/DL — SIGNIFICANT CHANGE UP (ref 0.5–1.3)
CULTURE RESULTS: SIGNIFICANT CHANGE UP
EGFR: 61 ML/MIN/1.73M2 — SIGNIFICANT CHANGE UP
EOSINOPHIL # BLD AUTO: 0.07 K/UL — SIGNIFICANT CHANGE UP (ref 0–0.5)
EOSINOPHIL NFR BLD AUTO: 1.5 % — SIGNIFICANT CHANGE UP (ref 0–6)
FOLATE SERPL-MCNC: 16.7 NG/ML — SIGNIFICANT CHANGE UP (ref 3.1–17.5)
GLUCOSE SERPL-MCNC: 123 MG/DL — HIGH (ref 70–99)
HCT VFR BLD CALC: 40.4 % — SIGNIFICANT CHANGE UP (ref 39–50)
HGB BLD-MCNC: 13.6 G/DL — SIGNIFICANT CHANGE UP (ref 13–17)
HIV 1+2 AB+HIV1 P24 AG SERPL QL IA: SIGNIFICANT CHANGE UP
IANC: 2.95 K/UL — SIGNIFICANT CHANGE UP (ref 1.8–7.4)
IMM GRANULOCYTES NFR BLD AUTO: 0.4 % — SIGNIFICANT CHANGE UP (ref 0–0.9)
LYMPHOCYTES # BLD AUTO: 1.06 K/UL — SIGNIFICANT CHANGE UP (ref 1–3.3)
LYMPHOCYTES # BLD AUTO: 23 % — SIGNIFICANT CHANGE UP (ref 13–44)
MAGNESIUM SERPL-MCNC: 1.9 MG/DL — SIGNIFICANT CHANGE UP (ref 1.6–2.6)
MCHC RBC-ENTMCNC: 31.4 PG — SIGNIFICANT CHANGE UP (ref 27–34)
MCHC RBC-ENTMCNC: 33.7 GM/DL — SIGNIFICANT CHANGE UP (ref 32–36)
MCV RBC AUTO: 93.3 FL — SIGNIFICANT CHANGE UP (ref 80–100)
MONOCYTES # BLD AUTO: 0.46 K/UL — SIGNIFICANT CHANGE UP (ref 0–0.9)
MONOCYTES NFR BLD AUTO: 10 % — SIGNIFICANT CHANGE UP (ref 2–14)
NEUTROPHILS # BLD AUTO: 2.95 K/UL — SIGNIFICANT CHANGE UP (ref 1.8–7.4)
NEUTROPHILS NFR BLD AUTO: 64.2 % — SIGNIFICANT CHANGE UP (ref 43–77)
NRBC # BLD: 0 /100 WBCS — SIGNIFICANT CHANGE UP (ref 0–0)
NRBC # FLD: 0 K/UL — SIGNIFICANT CHANGE UP (ref 0–0)
PHOSPHATE SERPL-MCNC: 2.4 MG/DL — LOW (ref 2.5–4.5)
PLATELET # BLD AUTO: 145 K/UL — LOW (ref 150–400)
POTASSIUM SERPL-MCNC: 3.9 MMOL/L — SIGNIFICANT CHANGE UP (ref 3.5–5.3)
POTASSIUM SERPL-SCNC: 3.9 MMOL/L — SIGNIFICANT CHANGE UP (ref 3.5–5.3)
RBC # BLD: 4.33 M/UL — SIGNIFICANT CHANGE UP (ref 4.2–5.8)
RBC # FLD: 12.2 % — SIGNIFICANT CHANGE UP (ref 10.3–14.5)
SODIUM SERPL-SCNC: 138 MMOL/L — SIGNIFICANT CHANGE UP (ref 135–145)
SPECIMEN SOURCE: SIGNIFICANT CHANGE UP
T PALLIDUM AB TITR SER: NEGATIVE — SIGNIFICANT CHANGE UP
VIT B12 SERPL-MCNC: 422 PG/ML — SIGNIFICANT CHANGE UP (ref 200–900)
WBC # BLD: 4.6 K/UL — SIGNIFICANT CHANGE UP (ref 3.8–10.5)
WBC # FLD AUTO: 4.6 K/UL — SIGNIFICANT CHANGE UP (ref 3.8–10.5)

## 2022-12-08 PROCEDURE — 99497 ADVNCD CARE PLAN 30 MIN: CPT | Mod: GC

## 2022-12-08 PROCEDURE — 99232 SBSQ HOSP IP/OBS MODERATE 35: CPT

## 2022-12-08 PROCEDURE — 99239 HOSP IP/OBS DSCHRG MGMT >30: CPT

## 2022-12-08 PROCEDURE — 99233 SBSQ HOSP IP/OBS HIGH 50: CPT

## 2022-12-08 RX ORDER — APIXABAN 2.5 MG/1
2 TABLET, FILM COATED ORAL
Qty: 120 | Refills: 0
Start: 2022-12-08 | End: 2023-01-06

## 2022-12-08 RX ORDER — METHADONE HYDROCHLORIDE 40 MG/1
13 TABLET ORAL
Qty: 0 | Refills: 0 | DISCHARGE
Start: 2022-12-08

## 2022-12-08 RX ORDER — NIFEDIPINE 30 MG
1 TABLET, EXTENDED RELEASE 24 HR ORAL
Qty: 30 | Refills: 0
Start: 2022-12-08 | End: 2023-01-06

## 2022-12-08 RX ORDER — QUETIAPINE FUMARATE 200 MG/1
25 TABLET, FILM COATED ORAL ONCE
Refills: 0 | Status: COMPLETED | OUTPATIENT
Start: 2022-12-08 | End: 2022-12-08

## 2022-12-08 RX ADMIN — APIXABAN 10 MILLIGRAM(S): 2.5 TABLET, FILM COATED ORAL at 05:23

## 2022-12-08 RX ADMIN — Medication 30 MILLIGRAM(S): at 05:23

## 2022-12-08 RX ADMIN — METHADONE HYDROCHLORIDE 130 MILLIGRAM(S): 40 TABLET ORAL at 11:38

## 2022-12-08 RX ADMIN — QUETIAPINE FUMARATE 25 MILLIGRAM(S): 200 TABLET, FILM COATED ORAL at 05:23

## 2022-12-08 RX ADMIN — QUETIAPINE FUMARATE 25 MILLIGRAM(S): 200 TABLET, FILM COATED ORAL at 05:52

## 2022-12-08 NOTE — PROGRESS NOTE ADULT - ASSESSMENT
77M with history of opioid use disorder, htn, with no known cardiac history presenting with AMS, confusion, anxiety, and elevated blood pressure in the setting of stopping recent psychiatric medications. Vitals significant for BP in the 180s-190s systolic, physical exam unremarkable, ekg showing Normal sinus rhythm without any abnormalities. On my exam he is AOx-2-3 (with prompting) and pleasant. He reports no recent cardiac symptoms except for acute worsening of his chronic lower extremity edema which I was able to appreciate on exam. Labs significant for a mild elevation in troponin. TTE WNL     #Hypertensive urgency  #Troponin elevation   -Troponin elevation likely 2/2 elevated blood pressures which can be seen in periods of acute agitation/untreated bp blood pressure and not ACS.   -Aim for BP <130/90  -Cardiology sign off can follow up with medicine vs. cardiology for BP control

## 2022-12-08 NOTE — BH CONSULTATION LIAISON PROGRESS NOTE - CURRENT MEDICATION
MEDICATIONS  (STANDING):  apixaban 10 milliGRAM(s) Oral every 12 hours  melatonin 3 milliGRAM(s) Oral at bedtime  methadone    Tablet 130 milliGRAM(s) Oral daily  NIFEdipine XL 30 milliGRAM(s) Oral daily    MEDICATIONS  (PRN):  acetaminophen     Tablet .. 650 milliGRAM(s) Oral every 6 hours PRN Temp greater or equal to 38C (100.4F), Mild Pain (1 - 3)  QUEtiapine 25 milliGRAM(s) Oral three times a day PRN Aggitation  
MEDICATIONS  (STANDING):  apixaban 10 milliGRAM(s) Oral every 12 hours  melatonin 3 milliGRAM(s) Oral at bedtime  methadone    Tablet 130 milliGRAM(s) Oral daily  NIFEdipine XL 30 milliGRAM(s) Oral daily    MEDICATIONS  (PRN):  acetaminophen     Tablet .. 650 milliGRAM(s) Oral every 6 hours PRN Temp greater or equal to 38C (100.4F), Mild Pain (1 - 3)  QUEtiapine 25 milliGRAM(s) Oral three times a day PRN Aggitation

## 2022-12-08 NOTE — BH CONSULTATION LIAISON PROGRESS NOTE - NSBHCHARTREVIEWVS_PSY_A_CORE FT
Vital Signs Last 24 Hrs  T(C): 36.7 (08 Dec 2022 09:00), Max: 36.7 (07 Dec 2022 16:59)  T(F): 98 (08 Dec 2022 09:00), Max: 98.1 (07 Dec 2022 16:59)  HR: 76 (08 Dec 2022 09:00) (53 - 89)  BP: 148/77 (08 Dec 2022 09:00) (143/69 - 179/79)  BP(mean): --  RR: 18 (08 Dec 2022 09:00) (17 - 18)  SpO2: 96% (08 Dec 2022 09:00) (96% - 98%)    Parameters below as of 08 Dec 2022 09:00  Patient On (Oxygen Delivery Method): room air    
Vital Signs Last 24 Hrs  T(C): 36.7 (08 Dec 2022 09:00), Max: 36.7 (07 Dec 2022 16:59)  T(F): 98 (08 Dec 2022 09:00), Max: 98.1 (07 Dec 2022 16:59)  HR: 76 (08 Dec 2022 09:00) (53 - 89)  BP: 148/77 (08 Dec 2022 09:00) (143/69 - 179/79)  BP(mean): --  RR: 18 (08 Dec 2022 09:00) (17 - 18)  SpO2: 96% (08 Dec 2022 09:00) (96% - 98%)    Parameters below as of 08 Dec 2022 09:00  Patient On (Oxygen Delivery Method): room air

## 2022-12-08 NOTE — BH CONSULTATION LIAISON PROGRESS NOTE - NSBHASSESSMENTFT_PSY_ALL_CORE
Warner Dc is a 77-year-old man with a PMHx of HTN, has a psychiatric history notable for Polysubstance use--- no recent use- on Methadone for opioid use disorder, now presenting with confusion and found to have hypertensive urgency at the Methadone clinic on 12/6/2022. Psychiatry consulted for assessment of mood.  Based on assessment done today and collateral obtained from wife, patient has a chronic history of polysubstance abuse, on Methadone for years with no evident drug/alcohol relapse. There has been declining cognition which has been not been fully evaluated by outpatient neurology. Some degree of depressive symptoms as well from patient report which does not meet criteria for MDD at this time. No SI or HI, and other than patient's declining functioning at home, wife has no psychiatric safety concerns. Suspecting superimposed acute confusion at this time-- likely delirium.     12/8---- better oriented, denies any mood symptoms, denies any si or hi, and denies any psychosis.     Recommendations  - Deferring observation status to medicine team.   - Continue current dose of Methadone 130mg q AM PO--- has been on this dose for years. Outpatient provider to attempt on lowering dose zackery as patient ages. Emailed outpatient provider--- awaiting call back  - Melatonin 3mg q 8pm PO--- to target sleep.  - Outpatient neurology f/u.   - AGITATION---if qtc<500, Seroquel 25mg TID PRN PO.   DISPOSITION=== Primary team to coordinate a safe d/c plan with wife. Patient to f/u at the [facility] Methadone clinic upon discharge- Chin Pascal NP.

## 2022-12-08 NOTE — PROGRESS NOTE ADULT - ATTENDING COMMENTS
personally saw and examined patient  labs and vitals reviewed  agree with above assessment and plan
personally saw and examined patient  labs and vitals reviewed  agree with above assessment and plan  pt comfortable appearing, bp now 160s systolic (on admission was 200s systolic)  please initiate antihypertensive meds, can start with lisinopril 10  pt denies cp sob  tte pending  cont supportive care per primary team, psych
76 y/o M w/ PMH heroin use disorder now on methadone, unmanaged HTN, presents with 4 days of AMS and LE edema, found to be in hypertensive urgency and with LLE proximal DVT.   Acute metabolic / Hypertensive encephalopathy resolved   New DVT LLE- Start Eliquis   F/w TTE   BP control.  Psych follow up   DC planning
76 y/o M w/ PMH heroin use disorder now on methadone, unmanaged HTN, presents with 4 days of AMS and LE edema, found to be in hypertensive urgency and with LLE proximal DVT.   Patient clinically improved ,   Patient is clinically improved , he is stable for DC. C/w Nifedipine 30 mg po Qday , can up titrate ouptaient for goal HTN.   C/w Howard , f/w hematology outpatient.   I spent 35 min coordinating DC.

## 2022-12-08 NOTE — BH CONSULTATION LIAISON PROGRESS NOTE - NSBHCHARTREVIEWLAB_PSY_A_CORE FT
CBC Full  -  ( 08 Dec 2022 07:10 )  WBC Count : 4.60 K/uL  RBC Count : 4.33 M/uL  Hemoglobin : 13.6 g/dL  Hematocrit : 40.4 %  Platelet Count - Automated : 145 K/uL  Mean Cell Volume : 93.3 fL  Mean Cell Hemoglobin : 31.4 pg  Mean Cell Hemoglobin Concentration : 33.7 gm/dL  Auto Neutrophil # : 2.95 K/uL  Auto Lymphocyte # : 1.06 K/uL  Auto Monocyte # : 0.46 K/uL  Auto Eosinophil # : 0.07 K/uL  Auto Basophil # : 0.04 K/uL  Auto Neutrophil % : 64.2 %  Auto Lymphocyte % : 23.0 %  Auto Monocyte % : 10.0 %  Auto Eosinophil % : 1.5 %  Auto Basophil % : 0.9 %  12-08    138  |  101  |  19  ----------------------------<  123<H>  3.9   |  26  |  1.22    Ca    9.1      08 Dec 2022 07:10  Phos  2.4     12-08  Mg     1.90     12-08    TPro  6.1  /  Alb  3.6  /  TBili  0.6  /  DBili  x   /  AST  39  /  ALT  28  /  AlkPhos  44  12-07

## 2022-12-08 NOTE — PROGRESS NOTE ADULT - SUBJECTIVE AND OBJECTIVE BOX
Kevon Rubio MD  Cardiology Fellow  [phone]  All Cardiology service information can be found 24/7 on [email or web address], password: [email or web address]    Patient seen and examined at bedside.    Overnight Events:   TTE WNL     Review Of Systems: No chest pain, shortness of breath, or palpitations            Current Meds:  acetaminophen     Tablet .. 650 milliGRAM(s) Oral every 6 hours PRN  apixaban 10 milliGRAM(s) Oral every 12 hours  melatonin 3 milliGRAM(s) Oral at bedtime  methadone    Tablet 130 milliGRAM(s) Oral daily  NIFEdipine XL 30 milliGRAM(s) Oral daily  QUEtiapine 25 milliGRAM(s) Oral three times a day PRN      Vitals:  T(F): 98 (12-08), Max: 98.1 (12-07)  HR: 89 (12-08) (53 - 89)  BP: 143/69 (12-08) (143/69 - 179/79)  RR: 17 (12-08)  SpO2: 96% (12-08)  I&O's Summary    07 Dec 2022 07:01  -  08 Dec 2022 07:00  --------------------------------------------------------  IN: 300 mL / OUT: 550 mL / NET: -250 mL        Physical Exam:  Appearance: No acute distress; well appearing  Eyes: PERRL, EOMI, pink conjunctiva  HEENT: Normal oral mucosa  Cardiovascular: RRR, S1, S2, no murmurs, rubs, or gallops; no edema; no JVD  Respiratory: Clear to auscultation bilaterally  Gastrointestinal: soft, non-tender, non-distended with normal bowel sounds  Musculoskeletal: No clubbing; no joint deformity   Neurologic: Non-focal  Lymphatic: No lymphadenopathy  Psychiatry: AAOx1-2, mood & affect appropriate  Skin: No rashes, ecchymoses, or cyanosis                        13.6   4.60  )-----------( 145      ( 08 Dec 2022 07:10 )             40.4     12-08    138  |  101  |  19  ----------------------------<  123<H>  3.9   |  26  |  1.22    Ca    9.1      08 Dec 2022 07:10  Phos  4.5     12-07  Mg     1.90     12-08    TPro  6.1  /  Alb  3.6  /  TBili  0.6  /  DBili  x   /  AST  39  /  ALT  28  /  AlkPhos  44  12-07    PT/INR - ( 06 Dec 2022 09:30 )   PT: 14.0 sec;   INR: 1.20 ratio         PTT - ( 06 Dec 2022 09:30 )  PTT:28.9 sec  CARDIAC MARKERS ( 07 Dec 2022 05:42 )  44 ng/L / x     / x     / x     / x     / x      CARDIAC MARKERS ( 06 Dec 2022 17:45 )  44 ng/L / x     / x     / x     / x     / x      CARDIAC MARKERS ( 06 Dec 2022 12:43 )  42 ng/L / x     / x     / x     / x     / x      CARDIAC MARKERS ( 06 Dec 2022 09:30 )  57 ng/L / x     / x     / x     / x     / x          Serum Pro-Brain Natriuretic Peptide: 194 pg/mL (12-06 @ 09:30)          New ECG(s): Personally reviewed        Interpretation of Telemetry:

## 2022-12-08 NOTE — PROGRESS NOTE ADULT - PROBLEM SELECTOR PLAN 2
LE edema for 2 weeks R>L, likely 2/2 DVT vs HF   Ordered LE Dopplers, w/ findings of L CVT in the left proximal and mid femoral vein. Deep venous insufficiency noted in right femoral and popliteal veins.    > Will start Eliquis 10BID for 7 days, then switch to 5mg BID

## 2022-12-08 NOTE — PROGRESS NOTE ADULT - PROBLEM SELECTOR PLAN 1
Pt w/ SBPs in 200s with mild troponemia  Hypertensive urgency likely 2/2 acute withdrawal of unknown substance   Cardiology consulted, recs appreciated  s/p 100mg labetalol, with BP now in 160s.   Will plan to normalize BP (<130/90) over 1-3d  F/u TTE  Will d/c pt with antihypertensive medication

## 2022-12-08 NOTE — DISCHARGE NOTE PROVIDER - HOSPITAL COURSE
78 y/o M w/ PMH heroin use disorder now on methadone, unmanaged HTN, presents with 4 days of AMS. Pt w/ previous heroin use, now on methadone for 30 years.  Also has been taking trazodone 50mg for sleep for many years.  Pt stopped taking trazadone 5-6d ago because he cannot get anymore (wife is unsure why, pt says can't get a hold of outpatient provider). Per surescripts, pt picked up a 30 day supply Nov 18 (prescribed by Ellyn Dwyer, addiction psychiatrist).  Per wife for the past 5-6 days prior to admission,  pt has been forgetting whether or not he takes his doses, has been pacing all night, not eating,  irritable, not sleeping, not participating in self-care/bathing. Pt also endorsed some SOB previously, 2 weeks of LE edema R>L, malaise. Pt also endorses urinary retention.     In ED, pt noted to be hypertensive to 200s with mild troponemia likely iso hypertensive urgency. BP was reduced cautiously with labetalol, then pt was started on PO nifedipine, with improvement to SBP 140s. Endorsed suicidal ideation and was initially placed on a 1:1, which was discontinued after psych evaluation. 78 y/o M w/ PMH heroin use disorder now on methadone, unmanaged HTN, presents with 4 days of AMS. Pt w/ previous heroin use, now on methadone for 30 years.  Also has been taking trazodone 50mg for sleep for many years.  Pt stopped taking trazadone 5-6d ago because he cannot get anymore (wife is unsure why, pt says can't get a hold of outpatient provider). Per surescripts, pt picked up a 30 day supply Nov 18 (prescribed by Ellyn Dwyer, addiction psychiatrist).  Per wife for the past 5-6 days prior to admission,  pt has been forgetting whether or not he takes his doses, has been pacing all night, not eating,  irritable, not sleeping, not participating in self-care/bathing. Pt also endorsed some SOB previously, 2 weeks of LE edema R>L, malaise. Pt also endorses urinary retention.     In ED, pt noted to be hypertensive to 200s with mild troponemia likely iso hypertensive urgency. BP was reduced cautiously with labetalol, then pt was started on PO nifedipine, with improvement to SBP 140s. Endorsed suicidal ideation and was initially placed on a 1:1, which was discontinued after psych evaluation. For LE edema, pt was found to have venous insufficiency in RLE and a DVT in the L proximal and mid-femoral veins, for which he was started on Eliquis. He also tested positive for Hepatitis C, but did not have Hep C RNA. Pt was made an appointment to follow up with us December 13th, at 9AM.

## 2022-12-08 NOTE — DISCHARGE NOTE NURSING/CASE MANAGEMENT/SOCIAL WORK - NSDCFUADDAPPT_GEN_ALL_CORE_FT
Please follow up at the [facility] Methadone clinic upon discharge with Chin Pascal NP.    We made an appointment for you with Dr. Breezy Rock on 12/13/2022 at 9am

## 2022-12-08 NOTE — DISCHARGE NOTE NURSING/CASE MANAGEMENT/SOCIAL WORK - NSDCPEFALRISK_GEN_ALL_CORE
For information on Fall & Injury Prevention, visit: [email or web address] OR  [email or web address] OR  https://www.cdc.gov/steadi/patient.html

## 2022-12-08 NOTE — DISCHARGE NOTE PROVIDER - NSDCFUSCHEDAPPT_GEN_ALL_CORE_FT
[facility]  INTMED OP [id number] [address]  Scheduled Appointment: 12/13/2022

## 2022-12-08 NOTE — DISCHARGE NOTE PROVIDER - NSFOLLOWUPCLINICS_GEN_ALL_ED_FT
[facility]  Hematology/Oncology  [address] [id number]  Phone: [phone]  Fax:

## 2022-12-08 NOTE — BH CONSULTATION LIAISON PROGRESS NOTE - NSBHFUPINTERVALHXFT_PSY_A_CORE
Met with patient. States that he hopes that when he f/u at the methadone program, his provider will be amenable to considering lowering the dose of Methadone. He denies any mood symptoms, denies any si or hi, and denies any ah or vh or paranoia.   States that the plan is for a d/c today, and he will f/u at the methadone program  Collateral was obtained yesterday from wife-- safety, treatment and discharge planning was done.

## 2022-12-08 NOTE — DISCHARGE NOTE PROVIDER - NSDCFUADDAPPT_GEN_ALL_CORE_FT
Please follow up at the [facility] Methadone clinic upon discharge with Chin Pascal NP.    We made an appointment for you with Dr. Breezy Rock on 12/13/2022 at 9am

## 2022-12-08 NOTE — PROGRESS NOTE ADULT - CONVERSATION DETAILS
D/w MOLST with patient , patient verbalized  chest compressions , Intubation/ mechanical ventilation , No artificial nutrition to be performed in case of cardiac arrest.

## 2022-12-08 NOTE — DISCHARGE NOTE PROVIDER - CARE PROVIDER_API CALL
[facility])  Internal Medicine  [phone] [place] [address]  Phone: [phone]  Fax: [phone]  Follow Up Time:

## 2022-12-08 NOTE — DISCHARGE NOTE NURSING/CASE MANAGEMENT/SOCIAL WORK - NSDCPEELIQUISDIET_GEN_ALL_CORE
Pt advised of Rx sent. Also reminded her that her Chantix from [facility] is here and ready for . Eat healthy foods you enjoy. Apixaban/Eliquis DOES NOT have a special diet. Limit your alcohol intake.

## 2022-12-08 NOTE — DISCHARGE NOTE PROVIDER - NSDCCPCAREPLAN_GEN_ALL_CORE_FT
PRINCIPAL DISCHARGE DIAGNOSIS  Diagnosis: Hypertensive urgency  Assessment and Plan of Treatment: You were admitted with very high blood pressures. Such high blood pressures increase your risks of complications such as strokes. Therefore, you need to take medications to lower your blood pressure. We have prescribed you nifedipinem for your blood pressure. Please take it daily. Please follow up at the scheduled appointment below to further manage your high blood pressure. The clinic will adjust your medications as needed.      SECONDARY DISCHARGE DIAGNOSES  Diagnosis: DVT, lower extremity  Assessment and Plan of Treatment: You were found to have two clots in the veins of your left leg. For this, you were started on a blood thinning medication called Eliquis.    Diagnosis: Hepatitis C test positive  Assessment and Plan of Treatment:     Diagnosis: Methadone use  Assessment and Plan of Treatment:      PRINCIPAL DISCHARGE DIAGNOSIS  Diagnosis: Hypertensive urgency  Assessment and Plan of Treatment: You were admitted with very high blood pressures. Such high blood pressures increase your risks of complications such as strokes. Therefore, you need to take medications to lower your blood pressure. We have prescribed you nifedipinem for your blood pressure. Please take it daily. Please follow up at the scheduled appointment below to further manage your high blood pressure. The clinic will adjust your medications as needed.      SECONDARY DISCHARGE DIAGNOSES  Diagnosis: DVT, lower extremity  Assessment and Plan of Treatment: You were found to have two clots in the veins of your left leg. For this, you were started on a blood thinning medication called Eliquis. It is important to note that you have to take Eliquis 10mg twice a day for the first 7 days, then continue taking 5mg twice per day. Thus, please continue taking Eliquis 10mg through December 13, then start taking Eliquis 5mg twice daily afterwards. Please follow up at your scheduled primary care appointment next week to further work up the clot.   To further evaluate the reason you are having clots in your legs, please make an appointment with Hematology as below.    Diagnosis: Hepatitis C test positive  Assessment and Plan of Treatment: A screening test resulted positive for prior hepatitis C. You do not have an active viral load.  Please follow up with the primary care provider as scheduled to further discuss implications of prior hepatitis C on the liver and for further monitoring.    Diagnosis: Methadone use  Assessment and Plan of Treatment: Please continue going to your methadone clinic. Please follow up with Chin Pascal NP for further titration on your methadone.

## 2022-12-08 NOTE — PROGRESS NOTE ADULT - PROBLEM SELECTOR PLAN 4
Pt positive for Hep C with virus S/CO ration 14  Per wife, pt was previously on treatment, unsure on follow up   Will email GI

## 2022-12-08 NOTE — PROGRESS NOTE ADULT - PROBLEM SELECTOR PLAN 3
Pt w/ increased irritability, anxiety, forgetfulness, insomnia, hypertensive, tachycardic   Given hx of addiction disorder, pt may be in acute withdrawal   Alcohol level <10  Utox w/o evidence of substance use other than methadone  Will obtain HIV, Vitamin b12 level, folate level, RPR  TSH WNL  s/p CIWA, remained at 0, now d/c  Repleting electrolytes  BH consulted, recs appreciated  Seroquel PRN

## 2022-12-08 NOTE — PROGRESS NOTE ADULT - ASSESSMENT
76 y/o M w/ PMH heroin use disorder now on methadone, unmanaged HTN, presents with 4 days of AMS and LE edema, found to be in hypertensive urgency and with LLE proximal DVT.

## 2022-12-08 NOTE — PROGRESS NOTE ADULT - PROBLEM SELECTOR PLAN 6
Pt w/ elevated troponins 57->42, likely iso hypertensive urgency  EKG NSR, non-concerning for acute cardiac event   pro-, less likely acute heart failure  Troponins downtrended   Pt asymptomatic   A1c cholesterol WNL   Pending TTE

## 2022-12-08 NOTE — PROGRESS NOTE ADULT - SUBJECTIVE AND OBJECTIVE BOX
Patient is a 77y old  Male who presents with a chief complaint of AMS (06 Dec 2022 17:30)    OVERNIGHT EVENTS: No acute events ON.     focused ROS as above    MEDICATIONS  (STANDING):  enoxaparin Injectable 40 milliGRAM(s) SubCutaneous every 24 hours    MEDICATIONS  (PRN):  acetaminophen     Tablet .. 650 milliGRAM(s) Oral every 6 hours PRN Temp greater or equal to 38C (100.4F), Mild Pain (1 - 3)  LORazepam     Tablet 1 milliGRAM(s) Oral every 2 hours PRN CIWA-Ar score increase by 2 points and a total score of 7 or less  LORazepam     Tablet 1 milliGRAM(s) Oral every 1 hour PRN CIWA-Ar score 8 or greater      OBJECTIVE:  Vital Signs Last 24 Hrs  T(C): 36.7 (08 Dec 2022 04:59), Max: 36.7 (07 Dec 2022 16:59)  T(F): 98 (08 Dec 2022 04:59), Max: 98.1 (07 Dec 2022 16:59)  HR: 89 (08 Dec 2022 04:59) (53 - 89)  BP: 143/69 (08 Dec 2022 04:59) (143/69 - 179/79)  BP(mean): --  RR: 17 (08 Dec 2022 04:59) (17 - 18)  SpO2: 96% (08 Dec 2022 04:59) (95% - 98%)    Parameters below as of 08 Dec 2022 04:59  Patient On (Oxygen Delivery Method): room air        GENERAL: Restless, fidgety.   HEAD:  Atraumatic, Normocephalic  ENT: EOMI, PERRLA, conjunctiva and sclera clear, Neck supple, No JVD, moist mucosa  CHEST/LUNG: Clear to auscultation bilaterally; No wheeze, equal breath sounds bilaterally   BACK: No spinal tenderness  HEART: Systolic ejection murmur right sternal border   ABDOMEN: Soft, Nontender, Nondistended; Bowel sounds present  EXTREMITIES: 1+ Pitting edema R>L,  No clubbing, cyanosis,    PSYCH: Restless, fidgety, normal affect   NEUROLOGY: AAOx3, non-focal, cranial nerves intact  SKIN: Normal color, No rashes or lesions    LABS:  LABS:                        12.3   5.27  )-----------( 138      ( 07 Dec 2022 05:42 )             36.9     12-    139  |  103  |  23  ----------------------------<  99  3.7   |  24  |  1.25    Ca    8.4      07 Dec 2022 05:42  Phos  4.5       Mg     1.80         TPro  6.1  /  Alb  3.6  /  TBili  0.6  /  DBili  x   /  AST  39  /  ALT  28  /  AlkPhos  44  12    PT/INR - ( 06 Dec 2022 09:30 )   PT: 14.0 sec;   INR: 1.20 ratio         PTT - ( 06 Dec 2022 09:30 )  PTT:28.9 sec      Urinalysis Basic - ( 06 Dec 2022 21:35 )    Color: Yellow / Appearance: Clear / S.019 / pH: x  Gluc: x / Ketone: Moderate  / Bili: Negative / Urobili: <2 mg/dL   Blood: x / Protein: Trace / Nitrite: Negative   Leuk Esterase: Negative / RBC: x / WBC x   Sq Epi: x / Non Sq Epi: x / Bacteria: x        Culture - Urine (collected 06 Dec 2022 21:38)  Source: Clean Catch Clean Catch (Midstream)  Final Report (08 Dec 2022 00:12):    <10,000 CFU/mL Normal Urogenital Daphney

## 2022-12-08 NOTE — PROGRESS NOTE ADULT - PROBLEM SELECTOR PLAN 5
Pt on chronic methadone therapy  Confirmed methadone dose with [facility] methadone clinic  Will c/w 130 daily, and pt will follow up for methadone management outpatient

## 2022-12-08 NOTE — DISCHARGE NOTE NURSING/CASE MANAGEMENT/SOCIAL WORK - PATIENT PORTAL LINK FT
You can access the FollowMyHealth Patient Portal offered by [facility] by registering at the following website: [email or web address]. By joining [employer]’s FollowMyHealth portal, you will also be able to view your health information using other applications (apps) compatible with our system.

## 2022-12-09 RX ORDER — APIXABAN 2.5 MG/1
2 TABLET, FILM COATED ORAL
Qty: 120 | Refills: 0
Start: 2022-12-09 | End: 2023-01-07

## 2022-12-13 ENCOUNTER — APPOINTMENT (OUTPATIENT)
Dept: INTERNAL MEDICINE | Facility: CLINIC | Age: 77
End: 2022-12-13

## 2022-12-14 ENCOUNTER — TRANSCRIPTION ENCOUNTER (OUTPATIENT)
Age: 77
End: 2022-12-14

## 2022-12-21 ENCOUNTER — EMERGENCY (EMERGENCY)
Facility: HOSPITAL | Age: 77
LOS: 1 days | Discharge: HOME | End: 2022-12-21
Attending: EMERGENCY MEDICINE | Admitting: EMERGENCY MEDICINE

## 2022-12-21 VITALS
OXYGEN SATURATION: 98 % | HEART RATE: 85 BPM | DIASTOLIC BLOOD PRESSURE: 95 MMHG | RESPIRATION RATE: 18 BRPM | SYSTOLIC BLOOD PRESSURE: 150 MMHG | TEMPERATURE: 98 F

## 2022-12-21 VITALS
TEMPERATURE: 98 F | RESPIRATION RATE: 18 BRPM | OXYGEN SATURATION: 100 % | HEART RATE: 98 BPM | SYSTOLIC BLOOD PRESSURE: 154 MMHG | DIASTOLIC BLOOD PRESSURE: 77 MMHG

## 2022-12-21 LAB
ALBUMIN SERPL ELPH-MCNC: 4.7 G/DL — SIGNIFICANT CHANGE UP (ref 3.3–5)
ALP SERPL-CCNC: 59 U/L — SIGNIFICANT CHANGE UP (ref 40–120)
ALT FLD-CCNC: 58 U/L — HIGH (ref 4–41)
ANION GAP SERPL CALC-SCNC: 13 MMOL/L — SIGNIFICANT CHANGE UP (ref 7–14)
APPEARANCE UR: CLEAR — SIGNIFICANT CHANGE UP
AST SERPL-CCNC: 57 U/L — HIGH (ref 4–40)
BASOPHILS # BLD AUTO: 0.03 K/UL — SIGNIFICANT CHANGE UP (ref 0–0.2)
BASOPHILS NFR BLD AUTO: 0.5 % — SIGNIFICANT CHANGE UP (ref 0–2)
BILIRUB SERPL-MCNC: 0.7 MG/DL — SIGNIFICANT CHANGE UP (ref 0.2–1.2)
BILIRUB UR-MCNC: NEGATIVE — SIGNIFICANT CHANGE UP
BUN SERPL-MCNC: 28 MG/DL — HIGH (ref 7–23)
CALCIUM SERPL-MCNC: 9.9 MG/DL — SIGNIFICANT CHANGE UP (ref 8.4–10.5)
CHLORIDE SERPL-SCNC: 98 MMOL/L — SIGNIFICANT CHANGE UP (ref 98–107)
CO2 SERPL-SCNC: 27 MMOL/L — SIGNIFICANT CHANGE UP (ref 22–31)
COLOR SPEC: YELLOW — SIGNIFICANT CHANGE UP
CREAT SERPL-MCNC: 1.25 MG/DL — SIGNIFICANT CHANGE UP (ref 0.5–1.3)
DIFF PNL FLD: NEGATIVE — SIGNIFICANT CHANGE UP
EGFR: 59 ML/MIN/1.73M2 — LOW
EOSINOPHIL # BLD AUTO: 0.03 K/UL — SIGNIFICANT CHANGE UP (ref 0–0.5)
EOSINOPHIL NFR BLD AUTO: 0.5 % — SIGNIFICANT CHANGE UP (ref 0–6)
GLUCOSE SERPL-MCNC: 107 MG/DL — HIGH (ref 70–99)
GLUCOSE UR QL: NEGATIVE — SIGNIFICANT CHANGE UP
HCT VFR BLD CALC: 39.8 % — SIGNIFICANT CHANGE UP (ref 39–50)
HGB BLD-MCNC: 13.7 G/DL — SIGNIFICANT CHANGE UP (ref 13–17)
IANC: 4.29 K/UL — SIGNIFICANT CHANGE UP (ref 1.8–7.4)
IMM GRANULOCYTES NFR BLD AUTO: 0.5 % — SIGNIFICANT CHANGE UP (ref 0–0.9)
KETONES UR-MCNC: ABNORMAL
LEUKOCYTE ESTERASE UR-ACNC: NEGATIVE — SIGNIFICANT CHANGE UP
LYMPHOCYTES # BLD AUTO: 1.19 K/UL — SIGNIFICANT CHANGE UP (ref 1–3.3)
LYMPHOCYTES # BLD AUTO: 19.6 % — SIGNIFICANT CHANGE UP (ref 13–44)
MCHC RBC-ENTMCNC: 31.8 PG — SIGNIFICANT CHANGE UP (ref 27–34)
MCHC RBC-ENTMCNC: 34.4 GM/DL — SIGNIFICANT CHANGE UP (ref 32–36)
MCV RBC AUTO: 92.3 FL — SIGNIFICANT CHANGE UP (ref 80–100)
MONOCYTES # BLD AUTO: 0.5 K/UL — SIGNIFICANT CHANGE UP (ref 0–0.9)
MONOCYTES NFR BLD AUTO: 8.2 % — SIGNIFICANT CHANGE UP (ref 2–14)
NEUTROPHILS # BLD AUTO: 4.29 K/UL — SIGNIFICANT CHANGE UP (ref 1.8–7.4)
NEUTROPHILS NFR BLD AUTO: 70.7 % — SIGNIFICANT CHANGE UP (ref 43–77)
NITRITE UR-MCNC: NEGATIVE — SIGNIFICANT CHANGE UP
NRBC # BLD: 0 /100 WBCS — SIGNIFICANT CHANGE UP (ref 0–0)
NRBC # FLD: 0 K/UL — SIGNIFICANT CHANGE UP (ref 0–0)
PCP SPEC-MCNC: SIGNIFICANT CHANGE UP
PH UR: 7 — SIGNIFICANT CHANGE UP (ref 5–8)
PLATELET # BLD AUTO: 213 K/UL — SIGNIFICANT CHANGE UP (ref 150–400)
POTASSIUM SERPL-MCNC: 4.2 MMOL/L — SIGNIFICANT CHANGE UP (ref 3.5–5.3)
POTASSIUM SERPL-SCNC: 4.2 MMOL/L — SIGNIFICANT CHANGE UP (ref 3.5–5.3)
PROT SERPL-MCNC: 7.8 G/DL — SIGNIFICANT CHANGE UP (ref 6–8.3)
PROT UR-MCNC: ABNORMAL
RBC # BLD: 4.31 M/UL — SIGNIFICANT CHANGE UP (ref 4.2–5.8)
RBC # FLD: 11.8 % — SIGNIFICANT CHANGE UP (ref 10.3–14.5)
SODIUM SERPL-SCNC: 138 MMOL/L — SIGNIFICANT CHANGE UP (ref 135–145)
SP GR SPEC: 1.02 — SIGNIFICANT CHANGE UP (ref 1.01–1.05)
TOXICOLOGY SCREEN, DRUGS OF ABUSE, SERUM RESULT: SIGNIFICANT CHANGE UP
TSH SERPL-MCNC: 1.98 UIU/ML — SIGNIFICANT CHANGE UP (ref 0.27–4.2)
UROBILINOGEN FLD QL: SIGNIFICANT CHANGE UP
WBC # BLD: 6.07 K/UL — SIGNIFICANT CHANGE UP (ref 3.8–10.5)
WBC # FLD AUTO: 6.07 K/UL — SIGNIFICANT CHANGE UP (ref 3.8–10.5)

## 2022-12-21 PROCEDURE — 99284 EMERGENCY DEPT VISIT MOD MDM: CPT | Mod: FS

## 2022-12-21 RX ADMIN — Medication 1 MILLIGRAM(S): at 16:50

## 2022-12-21 NOTE — ED ADULT NURSE NOTE - NSIMPLEMENTINTERV_GEN_ALL_ED
Implemented All Fall with Harm Risk Interventions:  [place] to call system. Call bell, personal items and telephone within reach. Instruct patient to call for assistance. Room bathroom lighting operational. Non-slip footwear when patient is off stretcher. Physically safe environment: no spills, clutter or unnecessary equipment. Stretcher in lowest position, wheels locked, appropriate side rails in place. Provide visual cue, wrist band, yellow gown, etc. Monitor gait and stability. Monitor for mental status changes and reorient to person, place, and time. Review medications for side effects contributing to fall risk. Reinforce activity limits and safety measures with patient and family. Provide visual clues: red socks.

## 2022-12-21 NOTE — ED PROVIDER NOTE - NSICDXPASTMEDICALHX_GEN_ALL_CORE_FT
PAST MEDICAL HISTORY:  Drug abuse and dependence Former heroin abuser, on methadone. Last use in 2013, on methadone for 45 years. Followed at [facility] methadone clinic.    Peripheral neuropathy

## 2022-12-21 NOTE — ED ADULT TRIAGE NOTE - CHIEF COMPLAINT QUOTE
pt presents to ED c/o "acute anxiety..I cant eat..I cant sleep...Im pacing all the time." pt states that he has a recent methadone dosage decrease from 133mg to 100mg daily. pt denies SI but "I have thoughts that I want to die."

## 2022-12-21 NOTE — ED PROVIDER NOTE - PATIENT PORTAL LINK FT
You can access the FollowMyHealth Patient Portal offered by [facility] by registering at the following website: [email or web address]. By joining [employer]’s FollowMyHealth portal, you will also be able to view your health information using other applications (apps) compatible with our system.

## 2022-12-21 NOTE — ED PROVIDER NOTE - CLINICAL SUMMARY MEDICAL DECISION MAKING FREE TEXT BOX
The patient is a 77y Male who has a past medical and surgery history of  Peripheral neuropathy Drug abuse and dependence Former heroin abuser, on methadone. Last use in 2013, on methadone for 45 years. Followed at [facility] methadone clinic PTED c/o "acute anxiety associated with restlessness anorexia insomnia in setting of recent taper of methadone dose reduction (133mg to 100mg daily) pt denies SI but "I have thoughts that I want to die."   Vital Signs Last 24 Hrs  T(F): 98 HR: 92 BP: 160/87 RR: 18 SpO2: 99% (21 Dec 2022 15:42) (99% - 100%)  PE: as described; my additions and exceptions are noted in the chart    DATA:  EKG: Pending 16327 EKG  Diagnosis Line Normal sinus rhythm  Inferior infarct , age undetermined  Abnormal ECG    LAB: Pending at time of evaluation

## 2022-12-21 NOTE — ED PROVIDER NOTE - NSFOLLOWUPINSTRUCTIONS_ED_ALL_ED_FT
You have been given information necessary to follow up with the  [facility] ([facility]) Crisis center & other outpatient  psychiatric clinics within your community    • [facility] walk in Crisis centre  [address] [place] [address] [phone] [email or web address]  Hours of operation:  Day	                                        Hours  Sunday                                  Closed  Monday                                9am - 3pm  Tuesday                                9am - 3pm  Wednesday                          9am - 3pm  Thursday                               9am - 3pm  Friday                                    9am - 3pm  Saturday                                Closed    .....additionally if your current problem is associated with drug or alcohol abuse further information can be obtained at the Drug Abuse Evaluation Health Referral [facility]ce ([facility])    • [facility] clinic [address] [place] [address] [phone] [email or web address]-service    Additionally if more support and information and help is needed in the area of suicide prevention pleas3 feel free to contact :   • [place] [address]  Phone: 0-883-719-IPSH (0245)  Web Address: http://www.suicidepreventionlifeline.org  • Suicide Awareness Voices of Education  [address] [place] [place], [place] [id number]  Phone: 1-552.883.4815  Web Address: http://www.save.org    Anxiety    WHAT YOU NEED TO KNOW:    Anxiety is a condition that causes you to feel extremely worried or nervous. The feelings are so strong that they can cause problems with your daily activities or sleep. Anxiety may be triggered by something you fear, or it may happen without a cause. Family or work stress, smoking, caffeine, and alcohol can increase your risk for anxiety. Certain medicines or health conditions can also increase your risk. Anxiety can become a long-term condition if it is not managed or treated.    DISCHARGE INSTRUCTIONS:    Call your local emergency number (911 in the [place]) if:    You have chest pain, tightness, or heaviness that may spread to your shoulders, arms, jaw, neck, or back.    You feel like hurting yourself or someone else.  Call your doctor if:    Your symptoms get worse or do not get better with treatment.    Your anxiety keeps you from doing your regular daily activities.    You have new symptoms since your last visit.    You have questions or concerns about your condition or care.  Medicines:    Medicines may be given to help you feel more calm and relaxed, and decrease your symptoms.    Take your medicine as directed. Contact your healthcare provider if you think your medicine is not helping or if you have side effects. Tell him of her if you are allergic to any medicine. Keep a list of the medicines, vitamins, and herbs you take. Include the amounts, and when and why you take them. Bring the list or the pill bottles to follow-up visits. Carry your medicine list with you in case of an emergency.  Manage anxiety:    Talk to someone about your anxiety. Your healthcare provider may suggest counseling. Cognitive behavioral therapy can help you understand and change how you react to events that trigger your symptoms. You might feel more comfortable talking with a friend or family member about your anxiety. Choose someone you know will be supportive and encouraging.    Find ways to relax. Activities such as exercise, meditation, or listening to music can help you relax. Spend time with friends, or do things you enjoy.    Practice deep breathing. Deep breathing can help you relax when you feel anxious. Focus on taking slow, deep breaths several times a day, or during an anxiety attack. Breathe in through your nose and out through your mouth.    Create a regular sleep routine. Regular sleep can help you feel calmer during the day. Go to sleep and wake up at the same times every day. Do not watch television or use the computer right before bed. Your room should be comfortable, dark, and quiet.    Eat a variety of healthy foods. Healthy foods include fruits, vegetables, low-fat dairy products, lean meats, fish, whole-grain breads, and cooked beans. Healthy foods can help you feel less anxious and have more energy.  Healthy Foods      Exercise regularly. Exercise can increase your energy level. Exercise may also lift your mood and help you sleep better. Your healthcare provider can help you create an exercise plan.  Walking for Exercise      Do not smoke. Nicotine and other chemicals in cigarettes and cigars can increase anxiety. Ask your healthcare provider for information if you currently smoke and need help to quit. E-cigarettes or smokeless tobacco still contain nicotine. Talk to your healthcare provider before you use these products.    Do not have caffeine. Caffeine can make your symptoms worse. Do not have foods or drinks that are meant to increase your energy level.    Limit or do not drink alcohol. Ask your healthcare provider if alcohol is safe for you. You may not be able to drink alcohol if you take certain anxiety or depression medicines. Limit alcohol to 1 drink per day if you are a woman. Limit alcohol to 2 drinks per day if you are a man. A drink of alcohol is 12 ounces of beer, 5 ounces of wine, or 1½ ounces of liquor.    Do not use drugs. Drugs can make your anxiety worse. It can also make anxiety hard to manage. Talk to your healthcare provider if you use drugs and want help to quit.  Follow up with your doctor within 2 weeks or as directed: Write down your questions so you remember to ask them during your visits. Follow up with a primary care doctor within 1 week, referral list attached   Follow up with your methadone prescriber and discuss titration of your methadone dose  Take Ativan 1mg (1 tablet) every 8 hours as needed for anxiety, caution drowsiness do not drive or drink alcohol while taking  Return to the ER with any worsening or concerning symptoms, restlessness, increased anxiety, thoughts of harming yourself or others, nausea, vomiting, abdominal pain or any other concerns.    You have been given information necessary to follow up with the  [facility] ([facility]) Crisis center & other outpatient  psychiatric clinics within your community    • [facility] walk in Crisis centre  [address] [place] [address] [phone] [email or web address]/[email or web address]-crisis-center  Hours of operation:  Day	                                        Hours  Sunday                                  Closed  Monday                                9am - 3pm  Tuesday                                9am - 3pm  Wednesday                          9am - 3pm  Thursday                               9am - 3pm  Friday                                    9am - 3pm  Saturday                                Closed    .....additionally if your current problem is associated with drug or alcohol abuse further information can be obtained at the Drug Abuse Evaluation Health Referral Servce ([facility])    • [facility] clinic [address] [place] [address] [phone] [email or web address]/behavioral-health/programs-services/drug-abuse-evaluation-health-referral-service    Additionally if more support and information and help is needed in the area of suicide prevention pleas3 feel free to contact :   • Su[place] [address]  Phone: 8-095-356-JSNP (8133)  Web Address: http://www.suicidepreventionlifeline.org  • Suicide Awareness Voices of Education  [address]  [place], [place]  Phone: 1-435.687.4818  Web Address: http://www.save.org    Anxiety    WHAT YOU NEED TO KNOW:    Anxiety is a condition that causes you to feel extremely worried or nervous. The feelings are so strong that they can cause problems with your daily activities or sleep. Anxiety may be triggered by something you fear, or it may happen without a cause. Family or work stress, smoking, caffeine, and alcohol can increase your risk for anxiety. Certain medicines or health conditions can also increase your risk. Anxiety can become a long-term condition if it is not managed or treated.    DISCHARGE INSTRUCTIONS:    Call your local emergency number (911 in the [place]) if:    You have chest pain, tightness, or heaviness that may spread to your shoulders, arms, jaw, neck, or back.    You feel like hurting yourself or someone else.  Call your doctor if:    Your symptoms get worse or do not get better with treatment.    Your anxiety keeps you from doing your regular daily activities.    You have new symptoms since your last visit.    You have questions or concerns about your condition or care.  Medicines:    Medicines may be given to help you feel more calm and relaxed, and decrease your symptoms.    Take your medicine as directed. Contact your healthcare provider if you think your medicine is not helping or if you have side effects. Tell him of her if you are allergic to any medicine. Keep a list of the medicines, vitamins, and herbs you take. Include the amounts, and when and why you take them. Bring the list or the pill bottles to follow-up visits. Carry your medicine list with you in case of an emergency.  Manage anxiety:    Talk to someone about your anxiety. Your healthcare provider may suggest counseling. Cognitive behavioral therapy can help you understand and change how you react to events that trigger your symptoms. You might feel more comfortable talking with a friend or family member about your anxiety. Choose someone you know will be supportive and encouraging.    Find ways to relax. Activities such as exercise, meditation, or listening to music can help you relax. Spend time with friends, or do things you enjoy.    Practice deep breathing. Deep breathing can help you relax when you feel anxious. Focus on taking slow, deep breaths several times a day, or during an anxiety attack. Breathe in through your nose and out through your mouth.    Create a regular sleep routine. Regular sleep can help you feel calmer during the day. Go to sleep and wake up at the same times every day. Do not watch television or use the computer right before bed. Your room should be comfortable, dark, and quiet.    Eat a variety of healthy foods. Healthy foods include fruits, vegetables, low-fat dairy products, lean meats, fish, whole-grain breads, and cooked beans. Healthy foods can help you feel less anxious and have more energy.  Healthy Foods      Exercise regularly. Exercise can increase your energy level. Exercise may also lift your mood and help you sleep better. Your healthcare provider can help you create an exercise plan.  Walking for Exercise      Do not smoke. Nicotine and other chemicals in cigarettes and cigars can increase anxiety. Ask your healthcare provider for information if you currently smoke and need help to quit. E-cigarettes or smokeless tobacco still contain nicotine. Talk to your healthcare provider before you use these products.    Do not have caffeine. Caffeine can make your symptoms worse. Do not have foods or drinks that are meant to increase your energy level.    Limit or do not drink alcohol. Ask your healthcare provider if alcohol is safe for you. You may not be able to drink alcohol if you take certain anxiety or depression medicines. Limit alcohol to 1 drink per day if you are a woman. Limit alcohol to 2 drinks per day if you are a man. A drink of alcohol is 12 ounces of beer, 5 ounces of wine, or 1½ ounces of liquor.    Do not use drugs. Drugs can make your anxiety worse. It can also make anxiety hard to manage. Talk to your healthcare provider if you use drugs and want help to quit.  Follow up with your doctor within 2 weeks or as directed: Write down your questions so you remember to ask them during your visits.

## 2022-12-21 NOTE — ED PROVIDER NOTE - OBJECTIVE STATEMENT
77-year-old male with past medical history hypertension, DVT on Eliquis, history of substance use now on methadone presenting to the ER with anxiety and trembling.  Patient reports feeling anxious over the past few weeks but the past few days has significantly worsened.  Patient pacing at home.  Patient wife at bedside states he recently stopped taking trazodone approximately 2 weeks ago.  Also had recent decrease in his methadone dose around the same time.  Patient denies fever/chills, chest pain, shortness of breath, palpitations, abdominal pain, nausea, vomiting, diarrhea, SI/HI or any other concerns. 77-year-old male with past medical history hypertension, DVT on Eliquis, history of substance use now on methadone presenting to the ER with anxiety and trembling.  Patient reports feeling anxious over the past few weeks but the past few days has significantly worsened.  Patient pacing at home, not sleeping well.  Patients wife at bedside states he recently stopped taking trazodone approximately 2 weeks ago.  Also had recent decrease in his methadone dose around the same time. Pt was recently admitted to [facility] from 12/06-12/08 with hypertensive urgency, started on Nifedipine at discharge, also had symptoms of anxiety at that time. Patient denies fever/chills, chest pain, shortness of breath, palpitations, abdominal pain, nausea, vomiting, diarrhea, SI/HI or any other concerns. Triage note mentions patient wants to die, discussed with pt, he states he does not want to live with the anxiety he is feeling but denies suicidal ideation, homicidal ideation, VH/AH.

## 2022-12-21 NOTE — ED ADULT NURSE NOTE - OBJECTIVE STATEMENT
Pt is a 76 y/o Male, A&Ox4, Ambulatory with a hx of methadone use, past drug/alcohol abuse, and peripheral neuropathy. Pt presents to the ED with c/o of anxiety. Pt reports constantly pacing the room and not being able to sty still for the past few days. Wife at bedside states pt's methadone treatment was recently changed from 130mg to 100mg. Pt endorses thoughts of "not wanting to live like this" due to the extreme restlessness. No SI/HI or VH/AH at present. Pt denies alcohol or drug use, last used drugs x many years ago. Pt's wife states pt recently stopped Trazadone that he was using for sleep. Pt acting very anxious on exam, is jittery and redirectable at present. Pt chantell any use of anti-anxiety medications or diagnosis. DAVID Abdullahi at bedside for assessment. Respirations even and unlabored, chest rise equal b/l. Abdomen soft, nontender, nondistended. Noted to have discoloration on b/l lower extremities. Ecchymosis noted on lower abdomen due to recent lovenox injections at last hospitalization. VS as noted in flowsheets. Safety maintained, will continue to monitor.

## 2022-12-21 NOTE — ED ADULT NURSE NOTE - AS TEMP SITE
Neurosurgery plan of care    Plan:  1. No further testing indicated from our perspective at this time.  2. Our office will call him in the next few days to check in  3. Follow up as planned in November    There is no evidence of a shunt malfunction or infection at this time.     HPI   29-year-old M Dr [place] patient with strata  shunted hydrocephalus set at 1.0 and a Chiari decompression and holocord syrinx who presented to  ER today with complaints of nausea, vomiting, tachycardia, and abdominal pain. Last time he felt this way he had a shunt malfunction. Patient denies headache, change in vision. CT Head in ED shows stable ventricles compared to January. CT Abdomen/Pelvis was negative. XR shunt shows no kinks or breaks in tubing. Exam reported as: neuro intact other than some abdominal tenderness      Barbara FISHERP-C  [facility] Neurosurgery  O. [phone]    
oral

## 2022-12-21 NOTE — ED PROVIDER NOTE - PROGRESS NOTE DETAILS
DAVID Batres: Pt reassessed reports significant improvement in symptoms. Continues to deny SI/HI. Pt states he is able to care for himself at home, lives with wife who is comfortable with pt returning home. Will send short course ativan to pts pharmacy. Discussed with pt to follow up with his methadone prescribed to discuss titration of medication given symptoms.

## 2022-12-21 NOTE — ED PROVIDER NOTE - ATTENDING APP SHARED VISIT CONTRIBUTION OF CARE
The patient is a 77y Male who has a past medical and surgery history of  Peripheral neuropathy Drug abuse and dependence Former heroin abuser, on methadone. Last use in 2013, on methadone for 45 years. Followed at [facility] methadone clinic PTED c/o "acute anxiety associated with restlessness anorexia insomnia in setting of recent taper of methadone dose reduction (133mg to 100mg daily) pt denies SI but "I have thoughts that I want to die."   Vital Signs Last 24 Hrs  T(F): 98 HR: 92 BP: 160/87 RR: 18 SpO2: 99% (21 Dec 2022 15:42) (99% - 100%)  PE: as described; my additions and exceptions are noted in the chart    DATA:  EKG: Pending 93071 EKG  Diagnosis Line Normal sinus rhythm  Inferior infarct , age undetermined  Abnormal ECG    LAB: Pending at time of evaluation    IMPRESSION/RISK:  Dx= Anxiety     Consideration include Some component of the anxiety might be associated with the methadone taper   Plan  LORazepam   Injectable 1 milliGRAM(s) IV Push  reassess if negative w/u will d/c with followup crisis center and [facility] Methadone clinic

## 2022-12-22 LAB
CULTURE RESULTS: SIGNIFICANT CHANGE UP
SPECIMEN SOURCE: SIGNIFICANT CHANGE UP

## 2022-12-23 NOTE — COUNSELING
[ - Annual Depression Screening] : Annual Depression Screening
Detail Level: Detailed
Comment: Previous PDT / 5-FU with non-satisfactory results
Render Risk Assessment In Note?: no

## 2023-01-03 ENCOUNTER — EMERGENCY (EMERGENCY)
Facility: HOSPITAL | Age: 78
LOS: 1 days | Discharge: HOME | End: 2023-01-03
Attending: EMERGENCY MEDICINE | Admitting: EMERGENCY MEDICINE
Payer: MEDICARE

## 2023-01-03 VITALS
DIASTOLIC BLOOD PRESSURE: 71 MMHG | SYSTOLIC BLOOD PRESSURE: 147 MMHG | OXYGEN SATURATION: 96 % | HEART RATE: 79 BPM | TEMPERATURE: 98 F | RESPIRATION RATE: 16 BRPM

## 2023-01-03 VITALS
DIASTOLIC BLOOD PRESSURE: 79 MMHG | SYSTOLIC BLOOD PRESSURE: 147 MMHG | OXYGEN SATURATION: 95 % | RESPIRATION RATE: 17 BRPM | TEMPERATURE: 98 F | HEART RATE: 60 BPM

## 2023-01-03 DIAGNOSIS — B18.2 CHRONIC VIRAL HEPATITIS C: ICD-10-CM

## 2023-01-03 DIAGNOSIS — F13.10 SEDATIVE, HYPNOTIC OR ANXIOLYTIC ABUSE, UNCOMPLICATED: ICD-10-CM

## 2023-01-03 DIAGNOSIS — F11.20 OPIOID DEPENDENCE, UNCOMPLICATED: ICD-10-CM

## 2023-01-03 DIAGNOSIS — F33.9 MAJOR DEPRESSIVE DISORDER, RECURRENT, UNSPECIFIED: ICD-10-CM

## 2023-01-03 DIAGNOSIS — I10 ESSENTIAL (PRIMARY) HYPERTENSION: ICD-10-CM

## 2023-01-03 LAB
ALBUMIN SERPL ELPH-MCNC: 4.3 G/DL — SIGNIFICANT CHANGE UP (ref 3.3–5)
ALP SERPL-CCNC: 70 U/L — SIGNIFICANT CHANGE UP (ref 40–120)
ALT FLD-CCNC: 28 U/L — SIGNIFICANT CHANGE UP (ref 4–41)
ANION GAP SERPL CALC-SCNC: 9 MMOL/L — SIGNIFICANT CHANGE UP (ref 7–14)
APPEARANCE UR: CLEAR — SIGNIFICANT CHANGE UP
AST SERPL-CCNC: 30 U/L — SIGNIFICANT CHANGE UP (ref 4–40)
BACTERIA # UR AUTO: NEGATIVE — SIGNIFICANT CHANGE UP
BASOPHILS # BLD AUTO: 0.04 K/UL — SIGNIFICANT CHANGE UP (ref 0–0.2)
BASOPHILS NFR BLD AUTO: 0.6 % — SIGNIFICANT CHANGE UP (ref 0–2)
BILIRUB SERPL-MCNC: 0.4 MG/DL — SIGNIFICANT CHANGE UP (ref 0.2–1.2)
BILIRUB UR-MCNC: NEGATIVE — SIGNIFICANT CHANGE UP
BUN SERPL-MCNC: 25 MG/DL — HIGH (ref 7–23)
CALCIUM SERPL-MCNC: 9.1 MG/DL — SIGNIFICANT CHANGE UP (ref 8.4–10.5)
CHLORIDE SERPL-SCNC: 99 MMOL/L — SIGNIFICANT CHANGE UP (ref 98–107)
CO2 SERPL-SCNC: 28 MMOL/L — SIGNIFICANT CHANGE UP (ref 22–31)
COLOR SPEC: SIGNIFICANT CHANGE UP
CREAT SERPL-MCNC: 1.23 MG/DL — SIGNIFICANT CHANGE UP (ref 0.5–1.3)
DIFF PNL FLD: NEGATIVE — SIGNIFICANT CHANGE UP
EGFR: 60 ML/MIN/1.73M2 — SIGNIFICANT CHANGE UP
EOSINOPHIL # BLD AUTO: 0.1 K/UL — SIGNIFICANT CHANGE UP (ref 0–0.5)
EOSINOPHIL NFR BLD AUTO: 1.4 % — SIGNIFICANT CHANGE UP (ref 0–6)
GLUCOSE SERPL-MCNC: 92 MG/DL — SIGNIFICANT CHANGE UP (ref 70–99)
GLUCOSE UR QL: NEGATIVE — SIGNIFICANT CHANGE UP
HCT VFR BLD CALC: 38.9 % — LOW (ref 39–50)
HGB BLD-MCNC: 12.6 G/DL — LOW (ref 13–17)
IANC: 5.01 K/UL — SIGNIFICANT CHANGE UP (ref 1.8–7.4)
IMM GRANULOCYTES NFR BLD AUTO: 0.6 % — SIGNIFICANT CHANGE UP (ref 0–0.9)
KETONES UR-MCNC: NEGATIVE — SIGNIFICANT CHANGE UP
LEUKOCYTE ESTERASE UR-ACNC: NEGATIVE — SIGNIFICANT CHANGE UP
LYMPHOCYTES # BLD AUTO: 1.17 K/UL — SIGNIFICANT CHANGE UP (ref 1–3.3)
LYMPHOCYTES # BLD AUTO: 16.8 % — SIGNIFICANT CHANGE UP (ref 13–44)
MAGNESIUM SERPL-MCNC: 2.1 MG/DL — SIGNIFICANT CHANGE UP (ref 1.6–2.6)
MCHC RBC-ENTMCNC: 30.5 PG — SIGNIFICANT CHANGE UP (ref 27–34)
MCHC RBC-ENTMCNC: 32.4 GM/DL — SIGNIFICANT CHANGE UP (ref 32–36)
MCV RBC AUTO: 94.2 FL — SIGNIFICANT CHANGE UP (ref 80–100)
MONOCYTES # BLD AUTO: 0.6 K/UL — SIGNIFICANT CHANGE UP (ref 0–0.9)
MONOCYTES NFR BLD AUTO: 8.6 % — SIGNIFICANT CHANGE UP (ref 2–14)
NEUTROPHILS # BLD AUTO: 5.01 K/UL — SIGNIFICANT CHANGE UP (ref 1.8–7.4)
NEUTROPHILS NFR BLD AUTO: 72 % — SIGNIFICANT CHANGE UP (ref 43–77)
NITRITE UR-MCNC: NEGATIVE — SIGNIFICANT CHANGE UP
NRBC # BLD: 0 /100 WBCS — SIGNIFICANT CHANGE UP (ref 0–0)
NRBC # FLD: 0 K/UL — SIGNIFICANT CHANGE UP (ref 0–0)
PH UR: 6.5 — SIGNIFICANT CHANGE UP (ref 5–8)
PHOSPHATE SERPL-MCNC: 3 MG/DL — SIGNIFICANT CHANGE UP (ref 2.5–4.5)
PLATELET # BLD AUTO: 180 K/UL — SIGNIFICANT CHANGE UP (ref 150–400)
POTASSIUM SERPL-MCNC: 5.5 MMOL/L — HIGH (ref 3.5–5.3)
POTASSIUM SERPL-SCNC: 5.5 MMOL/L — HIGH (ref 3.5–5.3)
PROT SERPL-MCNC: 7.2 G/DL — SIGNIFICANT CHANGE UP (ref 6–8.3)
PROT UR-MCNC: NEGATIVE — SIGNIFICANT CHANGE UP
RBC # BLD: 4.13 M/UL — LOW (ref 4.2–5.8)
RBC # FLD: 11.6 % — SIGNIFICANT CHANGE UP (ref 10.3–14.5)
RBC CASTS # UR COMP ASSIST: SIGNIFICANT CHANGE UP /HPF (ref 0–4)
SODIUM SERPL-SCNC: 136 MMOL/L — SIGNIFICANT CHANGE UP (ref 135–145)
SP GR SPEC: 1.01 — LOW (ref 1.01–1.05)
UROBILINOGEN FLD QL: SIGNIFICANT CHANGE UP
WBC # BLD: 6.96 K/UL — SIGNIFICANT CHANGE UP (ref 3.8–10.5)
WBC # FLD AUTO: 6.96 K/UL — SIGNIFICANT CHANGE UP (ref 3.8–10.5)
WBC UR QL: SIGNIFICANT CHANGE UP /HPF (ref 0–5)

## 2023-01-03 PROCEDURE — 70450 CT HEAD/BRAIN W/O DYE: CPT | Mod: 26,MA

## 2023-01-03 PROCEDURE — 99285 EMERGENCY DEPT VISIT HI MDM: CPT | Mod: GC

## 2023-01-03 RX ORDER — METHADONE HYDROCHLORIDE 40 MG/1
100 TABLET ORAL ONCE
Refills: 0 | Status: DISCONTINUED | OUTPATIENT
Start: 2023-01-03 | End: 2023-01-03

## 2023-01-03 RX ADMIN — METHADONE HYDROCHLORIDE 100 MILLIGRAM(S): 40 TABLET ORAL at 13:47

## 2023-01-03 NOTE — ED PROVIDER NOTE - PATIENT PORTAL LINK FT
You can access the FollowMyHealth Patient Portal offered by [facility] by registering at the following website: [email or web address]. By joining [facility]’s FollowMyHealth portal, you will also be able to view your health information using other applications (apps) compatible with our system.

## 2023-01-03 NOTE — ED ADULT NURSE NOTE - OBJECTIVE STATEMENT
Pt. A&OX3, brought to [facility] for eval. Pt. states he has been anxious and needs his methadone. Denies SI/HI/hallucinations. As per clinic, pt. might have taken double his dose yesterday. Pt. has worsening dementia and they were concerned so advised pt. to come to ER. #20g IV placed to left hand, labs sent. MD at bedside for eval. EKG performed. Rpt given to JEANETTE Reyes.

## 2023-01-03 NOTE — ED ADULT TRIAGE NOTE - CHIEF COMPLAINT QUOTE
Pt brought in by EMS  [facility] outpatient for anxiety. As per EMS pt has had increase in dementia and pacing more than normal. Pt is unsure if he took his methadone. Pt denies SI/HI. pt denies taking his methadone today.

## 2023-01-03 NOTE — ED PROVIDER NOTE - OBJECTIVE STATEMENT
76 y/o M with past medical history hypertension, DVT on Eliquis, history of substance use now on methadone brought in by EMS from the methadone clinic for anxiety.  Patient states he has been increasingly anxious for the last couple of days and has been unable to sleep.  Patient states he is continuously pacing.  Collateral received from nurse practitioner Chin at [facility] methadone clinic.  NP states patient was recently diagnosed with dementia.  NP also states that patient has been doubling up on medications due to forgetfulness per wife.  Patient took double his methadone dose and double his Ativan dose yesterday. Patient did not receive his methadone dose 100 mg today due to concern after taking double his dose yesterday. Patient denies SI/HI/AH/VH.  Patient denies headaches, fevers, chills, chest pain, shortness of breath, abdominal pain, nausea, vomiting, diarrhea, weakness.

## 2023-01-03 NOTE — ED PROVIDER NOTE - NSFOLLOWUPINSTRUCTIONS_ED_ALL_ED_FT
Follow up with your methadone clinic to discuss how to receive your meds in the future     You have been given information necessary to follow up with the  [facility] ([facility]) Crisis center & other outpatient  psychiatric clinics within your community    • [facility] walk in Crisis centre  [id number] [place] [address] [phone] [email or web address]  Hours of operation:  Day	                                        Hours  Sunday                                  Closed  Monday                                9am - 3pm  Tuesday                                9am - 3pm  Wednesday                          9am - 3pm  Thursday                               9am - 3pm  Friday                                    9am - 3pm  Saturday                                Closed    .....additionally if your current problem is associated with drug or alcohol abuse further information can be obtained at the Drug Abuse Evaluation Health Referral Servce ([facility])    • [facility] clinic [phone] [place] [address] [phone] [email or web address]    Additionally if more support and information and help is needed in the area of suicide prevention pleas3 feel free to contact :   • Suicide [place] [address]  Phone: 0-435-710-LMDG (4297)  Web Address: http://www.suicidepreventionlifeline.org  • Suicide Awareness Voices of Education  [address]  [place], [place]  Phone: 1-977.825.7380  Web Address: http://www.save.org    Anxiety    WHAT YOU NEED TO KNOW:    Anxiety is a condition that causes you to feel extremely worried or nervous. The feelings are so strong that they can cause problems with your daily activities or sleep. Anxiety may be triggered by something you fear, or it may happen without a cause. Family or work stress, smoking, caffeine, and alcohol can increase your risk for anxiety. Certain medicines or health conditions can also increase your risk. Anxiety can become a long-term condition if it is not managed or treated.    DISCHARGE INSTRUCTIONS:    Call your local emergency number (911 in the [place]) if:    You have chest pain, tightness, or heaviness that may spread to your shoulders, arms, jaw, neck, or back.    You feel like hurting yourself or someone else.  Call your doctor if:    Your symptoms get worse or do not get better with treatment.    Your anxiety keeps you from doing your regular daily activities.    You have new symptoms since your last visit.    You have questions or concerns about your condition or care.  Medicines:    Medicines may be given to help you feel more calm and relaxed, and decrease your symptoms.    Take your medicine as directed. Contact your healthcare provider if you think your medicine is not helping or if you have side effects. Tell him of her if you are allergic to any medicine. Keep a list of the medicines, vitamins, and herbs you take. Include the amounts, and when and why you take them. Bring the list or the pill bottles to follow-up visits. Carry your medicine list with you in case of an emergency.  Manage anxiety:    Talk to someone about your anxiety. Your healthcare provider may suggest counseling. Cognitive behavioral therapy can help you understand and change how you react to events that trigger your symptoms. You might feel more comfortable talking with a friend or family member about your anxiety. Choose someone you know will be supportive and encouraging.    Find ways to relax. Activities such as exercise, meditation, or listening to music can help you relax. Spend time with friends, or do things you enjoy.    Practice deep breathing. Deep breathing can help you relax when you feel anxious. Focus on taking slow, deep breaths several times a day, or during an anxiety attack. Breathe in through your nose and out through your mouth.    Create a regular sleep routine. Regular sleep can help you feel calmer during the day. Go to sleep and wake up at the same times every day. Do not watch television or use the computer right before bed. Your room should be comfortable, dark, and quiet.    Eat a variety of healthy foods. Healthy foods include fruits, vegetables, low-fat dairy products, lean meats, fish, whole-grain breads, and cooked beans. Healthy foods can help you feel less anxious and have more energy.  Healthy Foods      Exercise regularly. Exercise can increase your energy level. Exercise may also lift your mood and help you sleep better. Your healthcare provider can help you create an exercise plan.  Walking for Exercise      Do not smoke. Nicotine and other chemicals in cigarettes and cigars can increase anxiety. Ask your healthcare provider for information if you currently smoke and need help to quit. E-cigarettes or smokeless tobacco still contain nicotine. Talk to your healthcare provider before you use these products.    Do not have caffeine. Caffeine can make your symptoms worse. Do not have foods or drinks that are meant to increase your energy level.    Limit or do not drink alcohol. Ask your healthcare provider if alcohol is safe for you. You may not be able to drink alcohol if you take certain anxiety or depression medicines. Limit alcohol to 1 drink per day if you are a woman. Limit alcohol to 2 drinks per day if you are a man. A drink of alcohol is 12 ounces of beer, 5 ounces of wine, or 1½ ounces of liquor.    Do not use drugs. Drugs can make your anxiety worse. It can also make anxiety hard to manage. Talk to your healthcare provider if you use drugs and want help to quit.  Follow up with your doctor within 2 weeks or as directed: Write down your questions so you remember to ask them during your visits.

## 2023-01-03 NOTE — ED PROVIDER NOTE - ATTENDING CONTRIBUTION TO CARE
The patient is a 77y Male who has a past medical and surgery history of Peripheral neuropathy Former heroin abuser, on methadone PTED with Pt by EMS Stephanie for increasing anxiety and a worsening of presumed dementia. Reached out to NP states that patient has been doubling up on medications and took double his methadone dose and double his Ativan dose yesterday. Patient did not receive his methadone dose 100 mg today due to concern after taking double his dose yesterday. He denies SI/HI/AH/VH.  Patient denies headaches, fevers, chills, chest pain, shortness of breath, abdominal pain, nausea, vomiting, diarrhea, weakness.  As per EMS pt has had increase in dementia and pacing more than normal. Pt is unsure if he took his methadone. Pt denies SI/HI. pt denies taking his methadone today.   Vital Signs Last 24 Hrs  T(F): 98.1 HR: 79 BP: 147/71 RR: 16 SpO2: 96% (03 Jan 2023 11:22) (96% - 96%)    PE: as described; my additions and exceptions are noted in the chart    DATA:  EKG: pending at time of evaluation  LAB: Pending at time of evaluation    IMPRESSION/RISK:  Dx= Unclear picture of drug seeking vs dementia   Consideration include Dementia getting worse w/wo superimposed prescription drug abuse     Plan labs CT   Will reach out to wife for collateral   Reassess  Dispo as per results and assessment of patient's/family safety for outpt workup and whether significant dementia present at all

## 2023-01-03 NOTE — ED PROVIDER NOTE - CLINICAL SUMMARY MEDICAL DECISION MAKING FREE TEXT BOX
78 y/o M with PMHx of hypertension, DVT on Eliquis, history of substance use now on methadone brought in by EMS from the methadone clinic for anxiety. Per NP clinic patient did not receive methadone today due to concerns of doubling his doses yesterday.  Patient has not newly diagnosed dementia and has had increasing forgetfulness.  Differential diagnoses include but not limited to anxiety, hyperthyroid, acutely worsening dementia, malignancy.  We will get a EKG, labs, CT head Noncon to rule out organic causes for confusion, anxiety, and forgetfulness.  We will attempt to contact wife for more collateral and potential for safe discharge.

## 2023-01-03 NOTE — ED PROVIDER NOTE - PROGRESS NOTE DETAILS
Breanna Bass PGY1: Spoke with nurse practitioner Chin at [facility] of methadone clinic.  NP stated patient has had recent dementia diagnosis with worsening forgetfulness.  Patient's wife is very involved in care and has been attempting to help with medication dosing.  Patient has had multiple episodes of overdosing his medications and taking double the amount due to forgetfulness.  Patient took double his dose of methadone and Ativan yesterday.  Patient normally sees psychiatrist Dr. Dwyer at [facility].  Patient has been to the ED multiple times due to anxiety recently.  Attempted to contact patient's wife with no answer.  Will reattempt to contact her again.  There is current concern for whether this patient is safe at home due to worsening dementia and risk for overdose. Breanna Bass PGY1:  Collateral per wife: Breanna Bass PGY1:  Collateral per wife Korey Dc: Extensive conversation with patient's wife Korey.  Wife states that patient has been anxious and asking for more medication recently.  She is the  of the patient's methadone and other medication.  She states patient often becomes aggressive and verbally abusive when he does not feel well and wants more medications.  She states she does give him his medications if he is persistent about taking more.  She states patient took double his dose of methadone yesterday after getting aggressive with her about wanting more.  He also took 2-3 doses of his gabapentin.  She states he is a drug addict and is constantly messing with his pills.  She states she does not want him to come home and will call the  if he returns home.  She states he has been forgetful but has not gotten a formal diagnosis for dementia.  She states he has been a drug addict for most of his life and she feels she cannot take care of him any longer.  She feels as though this is affecting her everyday life and overall wellbeing.    Will contact social work for involvement in this case.  Patient is medically okay to return home.  Pending social work involvement.

## 2023-01-03 NOTE — PROVIDER CONTACT NOTE (OTHER) - ASSESSMENT
Per providers pt is discharged. Met with pt, he is alert, oriented x3, ambulatory. Pt asked to arrange a taxi. I Arranged [employer]. [employer] Inv# [id number]. Spoke with wife, she stated that pt keep asking for more Methadone. Advised her to take him clinic tomorrow. She agreed. P/U 5:30.

## 2023-01-03 NOTE — ED PROVIDER NOTE - PHYSICAL EXAMINATION
Constitutional: VS reviewed. Alert and orientedx3, well appearing, no apparent distress  HEENT: Atraumatic, EOMI  CV: RRR  Lungs: Clear and equal bilaterally, no wheezes, rales or crackles  Abdomen: Soft, nondistended, nontender  MSK: No deformities  Skin: Warm and dry. As visualized no rashes, lesions, bruising or erythema  Neuro: Appears nonfocal  Lymph: No pitting edema in extremities.  Psych: appropriate mood and affect, preoccupied with methadone but redirectable, denies SI/HI/AH/VH

## 2023-01-03 NOTE — ED ADULT NURSE NOTE - CHIEF COMPLAINT QUOTE
Pt brought in by EMS  [facility] outpatient for anxiety. As per EMS pt has had increase in dementia and pacing more than normal. Pt is unsure if he took his methadone. Pt denies SI/HI. pt denies taking his methadone today.

## 2023-01-04 LAB
CULTURE RESULTS: SIGNIFICANT CHANGE UP
SPECIMEN SOURCE: SIGNIFICANT CHANGE UP

## 2023-03-16 ENCOUNTER — APPOINTMENT (OUTPATIENT)
Dept: OPHTHALMOLOGY | Facility: CLINIC | Age: 78
End: 2023-03-16
Payer: MEDICARE

## 2023-03-16 ENCOUNTER — NON-APPOINTMENT (OUTPATIENT)
Age: 78
End: 2023-03-16

## 2023-03-16 PROCEDURE — 92250 FUNDUS PHOTOGRAPHY W/I&R: CPT

## 2023-03-16 PROCEDURE — 92004 COMPRE OPH EXAM NEW PT 1/>: CPT

## 2023-04-13 ENCOUNTER — NON-APPOINTMENT (OUTPATIENT)
Age: 78
End: 2023-04-13

## 2023-04-13 ENCOUNTER — APPOINTMENT (OUTPATIENT)
Dept: OPHTHALMOLOGY | Facility: CLINIC | Age: 78
End: 2023-04-13
Payer: MEDICARE

## 2023-04-13 PROCEDURE — 92133 CPTRZD OPH DX IMG PST SGM ON: CPT

## 2023-04-13 PROCEDURE — 76514 ECHO EXAM OF EYE THICKNESS: CPT

## 2023-04-13 PROCEDURE — 92012 INTRM OPH EXAM EST PATIENT: CPT

## 2023-04-13 PROCEDURE — 92083 EXTENDED VISUAL FIELD XM: CPT

## 2024-10-02 NOTE — DISCHARGE NOTE PROVIDER - NSCORESITESY/N_GEN_A_CORE_RD
Tacrolimus Follow Up Note    Michel Gaxiola  October 2, 2024    Tacrolimus level out of goal range today. Discussed with Mom that we are going to hold tacrolimus today 10/2 and tomorrow 10/3. Plan to reevaluate tacrolimus with provider appt on 10/3    Mom is planning on bringing all medications with syringes to double check dosages and quantity on 10/3.     The medication list has been updated and the repeat level has been ordered for 10/3.      Kamala Arriola CNP  Pediatric Blood and Marrow Transplant & Cellular Therapy Program  [facility]   Pager [phone]  Fax [phone]         No

## 2024-10-31 ENCOUNTER — APPOINTMENT (OUTPATIENT)
Dept: OPHTHALMOLOGY | Facility: CLINIC | Age: 79
End: 2024-10-31

## 2024-11-11 ENCOUNTER — NON-APPOINTMENT (OUTPATIENT)
Age: 79
End: 2024-11-11

## 2024-11-11 ENCOUNTER — APPOINTMENT (OUTPATIENT)
Dept: OPHTHALMOLOGY | Facility: CLINIC | Age: 79
End: 2024-11-11
Payer: MEDICARE

## 2024-11-11 PROCEDURE — 92015 DETERMINE REFRACTIVE STATE: CPT

## 2024-11-11 PROCEDURE — 92083 EXTENDED VISUAL FIELD XM: CPT

## 2024-11-11 PROCEDURE — 92014 COMPRE OPH EXAM EST PT 1/>: CPT

## 2024-11-11 PROCEDURE — 92133 CPTRZD OPH DX IMG PST SGM ON: CPT

## 2025-02-04 NOTE — BH CONSULTATION LIAISON PROGRESS NOTE - NSBHMSEHYG_PSY_A_CORE
Detail Level: Detailed X Size Of Lesion In Cm (Optional): 0 Detail Level: Simple Detail Level: Zone Fair

## 2025-03-19 ENCOUNTER — EMERGENCY (EMERGENCY)
Facility: HOSPITAL | Age: 80
LOS: 1 days | Discharge: HOME | End: 2025-03-19
Attending: STUDENT IN AN ORGANIZED HEALTH CARE EDUCATION/TRAINING PROGRAM | Admitting: STUDENT IN AN ORGANIZED HEALTH CARE EDUCATION/TRAINING PROGRAM
Payer: MEDICARE

## 2025-03-19 VITALS
SYSTOLIC BLOOD PRESSURE: 169 MMHG | OXYGEN SATURATION: 95 % | DIASTOLIC BLOOD PRESSURE: 68 MMHG | HEART RATE: 69 BPM | WEIGHT: 149.91 LBS | TEMPERATURE: 98 F | RESPIRATION RATE: 20 BRPM

## 2025-03-19 VITALS
DIASTOLIC BLOOD PRESSURE: 81 MMHG | RESPIRATION RATE: 18 BRPM | SYSTOLIC BLOOD PRESSURE: 154 MMHG | TEMPERATURE: 98 F | OXYGEN SATURATION: 95 % | HEART RATE: 69 BPM

## 2025-03-19 LAB
ALBUMIN SERPL ELPH-MCNC: 4.5 G/DL — SIGNIFICANT CHANGE UP (ref 3.3–5)
ALP SERPL-CCNC: 57 U/L — SIGNIFICANT CHANGE UP (ref 40–120)
ALT FLD-CCNC: 16 U/L — SIGNIFICANT CHANGE UP (ref 4–41)
ANION GAP SERPL CALC-SCNC: 14 MMOL/L — SIGNIFICANT CHANGE UP (ref 7–14)
APPEARANCE UR: CLEAR — SIGNIFICANT CHANGE UP
AST SERPL-CCNC: 32 U/L — SIGNIFICANT CHANGE UP (ref 4–40)
BACTERIA # UR AUTO: NEGATIVE /HPF — SIGNIFICANT CHANGE UP
BASOPHILS # BLD AUTO: 0.04 K/UL — SIGNIFICANT CHANGE UP (ref 0–0.2)
BASOPHILS NFR BLD AUTO: 0.6 % — SIGNIFICANT CHANGE UP (ref 0–2)
BILIRUB SERPL-MCNC: 0.9 MG/DL — SIGNIFICANT CHANGE UP (ref 0.2–1.2)
BILIRUB UR-MCNC: NEGATIVE — SIGNIFICANT CHANGE UP
BUN SERPL-MCNC: 25 MG/DL — HIGH (ref 7–23)
CALCIUM SERPL-MCNC: 9.6 MG/DL — SIGNIFICANT CHANGE UP (ref 8.4–10.5)
CAST: 2 /LPF — SIGNIFICANT CHANGE UP (ref 0–4)
CHLORIDE SERPL-SCNC: 97 MMOL/L — LOW (ref 98–107)
CO2 SERPL-SCNC: 26 MMOL/L — SIGNIFICANT CHANGE UP (ref 22–31)
COLOR SPEC: YELLOW — SIGNIFICANT CHANGE UP
CREAT SERPL-MCNC: 1.4 MG/DL — HIGH (ref 0.5–1.3)
DIFF PNL FLD: NEGATIVE — SIGNIFICANT CHANGE UP
EGFR: 51 ML/MIN/1.73M2 — LOW
EGFR: 51 ML/MIN/1.73M2 — LOW
EOSINOPHIL # BLD AUTO: 0.04 K/UL — SIGNIFICANT CHANGE UP (ref 0–0.5)
EOSINOPHIL NFR BLD AUTO: 0.6 % — SIGNIFICANT CHANGE UP (ref 0–6)
FLUAV AG NPH QL: SIGNIFICANT CHANGE UP
FLUBV AG NPH QL: SIGNIFICANT CHANGE UP
GLUCOSE SERPL-MCNC: 118 MG/DL — HIGH (ref 70–99)
GLUCOSE UR QL: NEGATIVE MG/DL — SIGNIFICANT CHANGE UP
HCT VFR BLD CALC: 39.7 % — SIGNIFICANT CHANGE UP (ref 39–50)
HGB BLD-MCNC: 13.9 G/DL — SIGNIFICANT CHANGE UP (ref 13–17)
IANC: 5.46 K/UL — SIGNIFICANT CHANGE UP (ref 1.8–7.4)
IMM GRANULOCYTES NFR BLD AUTO: 0.4 % — SIGNIFICANT CHANGE UP (ref 0–0.9)
INR BLD: 1.05 RATIO — SIGNIFICANT CHANGE UP (ref 0.85–1.16)
KETONES UR-MCNC: NEGATIVE MG/DL — SIGNIFICANT CHANGE UP
LEUKOCYTE ESTERASE UR-ACNC: NEGATIVE — SIGNIFICANT CHANGE UP
LYMPHOCYTES # BLD AUTO: 1.13 K/UL — SIGNIFICANT CHANGE UP (ref 1–3.3)
LYMPHOCYTES # BLD AUTO: 15.9 % — SIGNIFICANT CHANGE UP (ref 13–44)
MCHC RBC-ENTMCNC: 31.4 PG — SIGNIFICANT CHANGE UP (ref 27–34)
MCHC RBC-ENTMCNC: 35 G/DL — SIGNIFICANT CHANGE UP (ref 32–36)
MCV RBC AUTO: 89.8 FL — SIGNIFICANT CHANGE UP (ref 80–100)
MONOCYTES # BLD AUTO: 0.4 K/UL — SIGNIFICANT CHANGE UP (ref 0–0.9)
MONOCYTES NFR BLD AUTO: 5.6 % — SIGNIFICANT CHANGE UP (ref 2–14)
NEUTROPHILS # BLD AUTO: 5.46 K/UL — SIGNIFICANT CHANGE UP (ref 1.8–7.4)
NEUTROPHILS NFR BLD AUTO: 76.9 % — SIGNIFICANT CHANGE UP (ref 43–77)
NITRITE UR-MCNC: NEGATIVE — SIGNIFICANT CHANGE UP
NRBC # BLD AUTO: 0 K/UL — SIGNIFICANT CHANGE UP (ref 0–0)
NRBC # FLD: 0 K/UL — SIGNIFICANT CHANGE UP (ref 0–0)
NRBC BLD AUTO-RTO: 0 /100 WBCS — SIGNIFICANT CHANGE UP (ref 0–0)
PH UR: 7.5 — SIGNIFICANT CHANGE UP (ref 5–8)
PLATELET # BLD AUTO: 153 K/UL — SIGNIFICANT CHANGE UP (ref 150–400)
POTASSIUM SERPL-MCNC: 3.7 MMOL/L — SIGNIFICANT CHANGE UP (ref 3.5–5.3)
POTASSIUM SERPL-SCNC: 3.7 MMOL/L — SIGNIFICANT CHANGE UP (ref 3.5–5.3)
PROT SERPL-MCNC: 7.8 G/DL — SIGNIFICANT CHANGE UP (ref 6–8.3)
PROT UR-MCNC: 30 MG/DL
PROTHROM AB SERPL-ACNC: 12.5 SEC — SIGNIFICANT CHANGE UP (ref 9.9–13.4)
RBC # BLD: 4.42 M/UL — SIGNIFICANT CHANGE UP (ref 4.2–5.8)
RBC # FLD: 11.7 % — SIGNIFICANT CHANGE UP (ref 10.3–14.5)
RBC CASTS # UR COMP ASSIST: 2 /HPF — SIGNIFICANT CHANGE UP (ref 0–4)
RSV RNA NPH QL NAA+NON-PROBE: SIGNIFICANT CHANGE UP
SARS-COV-2 RNA SPEC QL NAA+PROBE: SIGNIFICANT CHANGE UP
SODIUM SERPL-SCNC: 137 MMOL/L — SIGNIFICANT CHANGE UP (ref 135–145)
SOURCE RESPIRATORY: SIGNIFICANT CHANGE UP
SP GR SPEC: 1.01 — SIGNIFICANT CHANGE UP (ref 1–1.03)
SQUAMOUS # UR AUTO: 1 /HPF — SIGNIFICANT CHANGE UP (ref 0–5)
UROBILINOGEN FLD QL: 0.2 MG/DL — SIGNIFICANT CHANGE UP (ref 0.2–1)
WBC # BLD: 7.1 K/UL — SIGNIFICANT CHANGE UP (ref 3.8–10.5)
WBC # FLD AUTO: 7.1 K/UL — SIGNIFICANT CHANGE UP (ref 3.8–10.5)
WBC UR QL: 0 /HPF — SIGNIFICANT CHANGE UP (ref 0–5)

## 2025-03-19 PROCEDURE — 93010 ELECTROCARDIOGRAM REPORT: CPT

## 2025-03-19 PROCEDURE — 99284 EMERGENCY DEPT VISIT MOD MDM: CPT | Mod: FS

## 2025-03-19 PROCEDURE — 71046 X-RAY EXAM CHEST 2 VIEWS: CPT | Mod: 26

## 2025-03-19 RX ORDER — METHADONE HCL 10 MG
98 TABLET ORAL ONCE
Refills: 0 | Status: DISCONTINUED | OUTPATIENT
Start: 2025-03-19 | End: 2025-03-19

## 2025-03-19 RX ADMIN — Medication 98 MILLIGRAM(S): at 13:54

## 2025-03-19 NOTE — ED ADULT NURSE NOTE - NSFALLUNIVINTERV_ED_ALL_ED
Medication:   Requested Prescriptions     Pending Prescriptions Disp Refills    atorvastatin (LIPITOR) 20 MG tablet 90 tablet 3     Sig: TAKE 1 TABLET BY MOUTH EVERY DAY        Last Filled:  6/4/2020 #90 Refills 3    Patient Phone Number: [phone] (home)     Last appt: 10/20/2020   Next appt: Visit date not found    Last OARRS: No flowsheet data found. Bed/Stretcher in lowest position, wheels locked, appropriate side rails in place/Call bell, personal items and telephone in reach/Instruct patient to call for assistance before getting out of bed/chair/stretcher/Non-slip footwear applied when patient is off stretcher/[place] to call system/Physically safe environment - no spills, clutter or unnecessary equipment/Purposeful proactive rounding/Room/bathroom lighting operational, light cord in reach

## 2025-03-19 NOTE — ED PROVIDER NOTE - PROGRESS NOTE DETAILS
DAVID Benavides: pt able to ambulate without difficulty, tolerating PO.  Pt pending daily methadone dose. DAVID Benavides: pt feels better ambulating without difficulty.  Results reviewed with patient.  Discharge reviewed and discussed with patient.

## 2025-03-19 NOTE — ED ADULT NURSE NOTE - OBJECTIVE STATEMENT
Pt received in 23A. Pt laying in stretcher comfortably. 79 year old male c/o weakness x multiple days. Pt endorses chills and feeling hungry but not able to eat. Pt denies sick contacts, recent travel, headache, fever, dizziness, lightheadedness, chest pain, SOB, or any pain at this time. Pt received in 23A. Pt laying in stretcher comfortably. 79 year old male c/o weakness x multiple days. Pt endorses chills and feeling hungry but not able to eat. Pt endorses additional constipation this morning. Pt denies sick contacts, recent travel, headache, fever, dizziness, lightheadedness, cough, chest pain, SOB, or any pain at this time.    AxOx4 and ambulatory independently. RR even and unlabored on room air, lung sounds clear to auscultation in all fields. S1 and S2 noted, rate and rhythm regular. Abdomen soft, nondistended, and nontender, normoactive bowel sounds in all 4 quadrants. 20G IV placed in right forearm. Safety maintained.

## 2025-03-19 NOTE — ED PROVIDER NOTE - PATIENT PORTAL LINK FT
You can access the FollowMyHealth Patient Portal offered by [facility] by registering at the following website: [email or web address]. By joining [employer]’s FollowMyHealth portal, you will also be able to view your health information using other applications (apps) compatible with our system.

## 2025-03-19 NOTE — ED PROVIDER NOTE - OBJECTIVE STATEMENT
79-year-old male with a past medical history of HTN, substance abuse on methadone presents to the emergency department from methadone clinic complaining of 2 to 3 days of generalized weakness, difficulty sleeping, decreased appetite.  Patient denies chest pain, shortness of breath, fevers, chills, cough, nausea, vomiting, diarrhea, dysuria, frequency, hematuria.

## 2025-03-19 NOTE — ED PROVIDER NOTE - NSFOLLOWUPINSTRUCTIONS_ED_ALL_ED_FT
Follow up with your Primary Medical Doctor in 1-2 days.  Rest.  Stay well hydrated.  Return to the ER for any persistent/worsening or new symptoms chest pain, shortness of breath, vomiting, weakness, dizziness, fevers, chills or any concerning symptoms.

## 2025-03-19 NOTE — ED PROVIDER NOTE - ATTENDING APP SHARED VISIT CONTRIBUTION OF CARE
79-year-old male past medical history of hypertension and opioid use disorder on methadone presents emergency department with 2 to 3 days of generalized weakness, also had slight decreased appetite.  Went to get his methadone earlier today and was "shaky", therefore sent to the emergency department.  Patient has no acute medical complaints in the emergency department other than decreased appetite.  Patient is neurologically intact ambulatory without ataxia, has a steady gait.  No acute toxidrome on exam.  Lungs clear to auscultation bilaterally, regular rate and rhythm.  EKG was obtained which shows normal intervals, CMP nonactionable in the emergency department.  Patient's methadone dose was confirmed and he was given his methadone.  Patient felt comfortable going home discharge with family.

## 2025-03-19 NOTE — ED PROVIDER NOTE - CLINICAL SUMMARY MEDICAL DECISION MAKING FREE TEXT BOX
79-year-old male with a past medical history of HTN, substance abuse on methadone presents to the emergency department from methadone clinic complaining of 2 to 3 days of generalized weakness, difficulty sleeping, decreased appetite.  Will obtain labs, EKG, CXR, reassess.  Spoke with Dr Hurtado at [facility] Methadone clinic states pt takes Methadone 98mg daily states did not get today's dose was sent directly to the ER.

## 2025-03-19 NOTE — ED PROVIDER NOTE - NSICDXPASTMEDICALHX_GEN_ALL_CORE_FT
PAST MEDICAL HISTORY:  Drug abuse and dependence Former heroin abuser, on methadone. Last use in 2013, on methadone for 45 years. Followed at [facility] methadone clinic.    Peripheral neuropathy

## 2025-03-19 NOTE — ED ADULT TRIAGE NOTE - CHIEF COMPLAINT QUOTE
sent in by MD for decreased appetite, insomnia, generalized weakness x 3 days- on weekly methadone last dose yesterday- denies additional hx

## 2025-05-09 ENCOUNTER — EMERGENCY (EMERGENCY)
Facility: HOSPITAL | Age: 80
LOS: 1 days | End: 2025-05-09
Attending: EMERGENCY MEDICINE | Admitting: EMERGENCY MEDICINE
Payer: MEDICARE

## 2025-05-09 VITALS
HEART RATE: 68 BPM | DIASTOLIC BLOOD PRESSURE: 76 MMHG | RESPIRATION RATE: 18 BRPM | TEMPERATURE: 98 F | OXYGEN SATURATION: 98 % | SYSTOLIC BLOOD PRESSURE: 140 MMHG

## 2025-05-09 VITALS
WEIGHT: 145.06 LBS | SYSTOLIC BLOOD PRESSURE: 145 MMHG | DIASTOLIC BLOOD PRESSURE: 78 MMHG | HEART RATE: 85 BPM | TEMPERATURE: 98 F | RESPIRATION RATE: 16 BRPM | OXYGEN SATURATION: 99 %

## 2025-05-09 LAB
ALBUMIN SERPL ELPH-MCNC: 4.7 G/DL — SIGNIFICANT CHANGE UP (ref 3.3–5)
ALP SERPL-CCNC: 57 U/L — SIGNIFICANT CHANGE UP (ref 40–120)
ALT FLD-CCNC: 19 U/L — SIGNIFICANT CHANGE UP (ref 4–41)
ANION GAP SERPL CALC-SCNC: 13 MMOL/L — SIGNIFICANT CHANGE UP (ref 7–14)
APAP SERPL-MCNC: <10 UG/ML — LOW (ref 15–25)
APPEARANCE UR: CLEAR — SIGNIFICANT CHANGE UP
AST SERPL-CCNC: 32 U/L — SIGNIFICANT CHANGE UP (ref 4–40)
BASOPHILS # BLD AUTO: 0.04 K/UL — SIGNIFICANT CHANGE UP (ref 0–0.2)
BASOPHILS NFR BLD AUTO: 0.6 % — SIGNIFICANT CHANGE UP (ref 0–2)
BILIRUB SERPL-MCNC: 0.9 MG/DL — SIGNIFICANT CHANGE UP (ref 0.2–1.2)
BILIRUB UR-MCNC: NEGATIVE — SIGNIFICANT CHANGE UP
BUN SERPL-MCNC: 30 MG/DL — HIGH (ref 7–23)
CALCIUM SERPL-MCNC: 10 MG/DL — SIGNIFICANT CHANGE UP (ref 8.4–10.5)
CHLORIDE SERPL-SCNC: 96 MMOL/L — LOW (ref 98–107)
CO2 SERPL-SCNC: 28 MMOL/L — SIGNIFICANT CHANGE UP (ref 22–31)
COLOR SPEC: YELLOW — SIGNIFICANT CHANGE UP
CREAT SERPL-MCNC: 1.37 MG/DL — HIGH (ref 0.5–1.3)
DIFF PNL FLD: NEGATIVE — SIGNIFICANT CHANGE UP
EGFR: 52 ML/MIN/1.73M2 — LOW
EGFR: 52 ML/MIN/1.73M2 — LOW
EOSINOPHIL # BLD AUTO: 0.05 K/UL — SIGNIFICANT CHANGE UP (ref 0–0.5)
EOSINOPHIL NFR BLD AUTO: 0.8 % — SIGNIFICANT CHANGE UP (ref 0–6)
ETHANOL SERPL-MCNC: <10 MG/DL — SIGNIFICANT CHANGE UP
FLUAV AG NPH QL: SIGNIFICANT CHANGE UP
FLUBV AG NPH QL: SIGNIFICANT CHANGE UP
GAS PNL BLDV: SIGNIFICANT CHANGE UP
GLUCOSE SERPL-MCNC: 114 MG/DL — HIGH (ref 70–99)
GLUCOSE UR QL: NEGATIVE MG/DL — SIGNIFICANT CHANGE UP
HCT VFR BLD CALC: 39 % — SIGNIFICANT CHANGE UP (ref 39–50)
HGB BLD-MCNC: 13.9 G/DL — SIGNIFICANT CHANGE UP (ref 13–17)
IANC: 4.43 K/UL — SIGNIFICANT CHANGE UP (ref 1.8–7.4)
IMM GRANULOCYTES NFR BLD AUTO: 0.3 % — SIGNIFICANT CHANGE UP (ref 0–0.9)
KETONES UR-MCNC: ABNORMAL MG/DL
LEUKOCYTE ESTERASE UR-ACNC: NEGATIVE — SIGNIFICANT CHANGE UP
LYMPHOCYTES # BLD AUTO: 1.52 K/UL — SIGNIFICANT CHANGE UP (ref 1–3.3)
LYMPHOCYTES # BLD AUTO: 23 % — SIGNIFICANT CHANGE UP (ref 13–44)
MAGNESIUM SERPL-MCNC: 2 MG/DL — SIGNIFICANT CHANGE UP (ref 1.6–2.6)
MCHC RBC-ENTMCNC: 31.9 PG — SIGNIFICANT CHANGE UP (ref 27–34)
MCHC RBC-ENTMCNC: 35.6 G/DL — SIGNIFICANT CHANGE UP (ref 32–36)
MCV RBC AUTO: 89.4 FL — SIGNIFICANT CHANGE UP (ref 80–100)
MONOCYTES # BLD AUTO: 0.54 K/UL — SIGNIFICANT CHANGE UP (ref 0–0.9)
MONOCYTES NFR BLD AUTO: 8.2 % — SIGNIFICANT CHANGE UP (ref 2–14)
NEUTROPHILS # BLD AUTO: 4.43 K/UL — SIGNIFICANT CHANGE UP (ref 1.8–7.4)
NEUTROPHILS NFR BLD AUTO: 67.1 % — SIGNIFICANT CHANGE UP (ref 43–77)
NITRITE UR-MCNC: NEGATIVE — SIGNIFICANT CHANGE UP
NRBC # BLD AUTO: 0 K/UL — SIGNIFICANT CHANGE UP (ref 0–0)
NRBC # FLD: 0 K/UL — SIGNIFICANT CHANGE UP (ref 0–0)
NRBC BLD AUTO-RTO: 0 /100 WBCS — SIGNIFICANT CHANGE UP (ref 0–0)
PH UR: 7.5 — SIGNIFICANT CHANGE UP (ref 5–8)
PHOSPHATE SERPL-MCNC: 1.1 MG/DL — LOW (ref 2.5–4.5)
PLATELET # BLD AUTO: 207 K/UL — SIGNIFICANT CHANGE UP (ref 150–400)
POTASSIUM SERPL-MCNC: 3 MMOL/L — LOW (ref 3.5–5.3)
POTASSIUM SERPL-SCNC: 3 MMOL/L — LOW (ref 3.5–5.3)
PROT SERPL-MCNC: 8 G/DL — SIGNIFICANT CHANGE UP (ref 6–8.3)
PROT UR-MCNC: NEGATIVE MG/DL — SIGNIFICANT CHANGE UP
RBC # BLD: 4.36 M/UL — SIGNIFICANT CHANGE UP (ref 4.2–5.8)
RBC # FLD: 11.5 % — SIGNIFICANT CHANGE UP (ref 10.3–14.5)
RSV RNA NPH QL NAA+NON-PROBE: SIGNIFICANT CHANGE UP
SALICYLATES SERPL-MCNC: <0.3 MG/DL — LOW (ref 15–30)
SARS-COV-2 RNA SPEC QL NAA+PROBE: SIGNIFICANT CHANGE UP
SODIUM SERPL-SCNC: 137 MMOL/L — SIGNIFICANT CHANGE UP (ref 135–145)
SOURCE RESPIRATORY: SIGNIFICANT CHANGE UP
SP GR SPEC: 1.02 — SIGNIFICANT CHANGE UP (ref 1–1.03)
TOXICOLOGY SCREEN, DRUGS OF ABUSE, SERUM RESULT: SIGNIFICANT CHANGE UP
UROBILINOGEN FLD QL: 0.2 MG/DL — SIGNIFICANT CHANGE UP (ref 0.2–1)
WBC # BLD: 6.6 K/UL — SIGNIFICANT CHANGE UP (ref 3.8–10.5)
WBC # FLD AUTO: 6.6 K/UL — SIGNIFICANT CHANGE UP (ref 3.8–10.5)

## 2025-05-09 PROCEDURE — 71046 X-RAY EXAM CHEST 2 VIEWS: CPT | Mod: 26

## 2025-05-09 PROCEDURE — 70450 CT HEAD/BRAIN W/O DYE: CPT | Mod: 26

## 2025-05-09 PROCEDURE — 93010 ELECTROCARDIOGRAM REPORT: CPT

## 2025-05-09 PROCEDURE — 99285 EMERGENCY DEPT VISIT HI MDM: CPT | Mod: GC

## 2025-05-09 RX ORDER — MAGNESIUM SULFATE 500 MG/ML
1 SYRINGE (ML) INJECTION ONCE
Refills: 0 | Status: COMPLETED | OUTPATIENT
Start: 2025-05-09 | End: 2025-05-09

## 2025-05-09 RX ORDER — SOD PHOS DI, MONO/K PHOS MONO 250 MG
1 TABLET ORAL ONCE
Refills: 0 | Status: COMPLETED | OUTPATIENT
Start: 2025-05-09 | End: 2025-05-09

## 2025-05-09 RX ORDER — SODIUM PHOSPHATE,DIBASIC DIHYD
30 POWDER (GRAM) MISCELLANEOUS ONCE
Refills: 0 | Status: COMPLETED | OUTPATIENT
Start: 2025-05-09 | End: 2025-05-09

## 2025-05-09 RX ADMIN — Medication 100 MILLIEQUIVALENT(S): at 17:03

## 2025-05-09 RX ADMIN — Medication 40 MILLIEQUIVALENT(S): at 14:06

## 2025-05-09 RX ADMIN — Medication 100 GRAM(S): at 14:10

## 2025-05-09 RX ADMIN — Medication 1000 MILLILITER(S): at 11:58

## 2025-05-09 RX ADMIN — Medication 100 MILLIEQUIVALENT(S): at 16:15

## 2025-05-09 RX ADMIN — Medication 85 MILLIMOLE(S): at 17:03

## 2025-05-09 RX ADMIN — Medication 1 PACKET(S): at 14:06

## 2025-05-09 RX ADMIN — Medication 100 MILLIEQUIVALENT(S): at 14:17

## 2025-05-09 RX ADMIN — Medication 1 PACKET(S): at 18:59

## 2025-05-09 NOTE — ED ADULT TRIAGE NOTE - ARRIVAL FROM
[facility], [address] [place] [address]     10/18/22    Jason Westfall  [address] [facility] [address]    Dear Jason,    You are scheduled for a Watchman procedure with BRISA Potter M.D. on November 14th, 2022. Please arrive at 8:30AM.    Your procedure is scheduled at:  [facility]  [address] [place] [address]    Enter through the main entrance, take elevators to the 1st floor, follow the hallway past the gift shop towards glass elevator on the left side of the hallway check in at [facility].    Covid-19 Pre-Procedure Testing:   Based on current CDC guidelines and state regulatory requirements you will be scheduled for pre procedure Covid 19 testing. This is being done as safety is our top priority for you and our team.     Your pre procedure Covid test and blood work should be done at the ACL laboratory located at [facility] on November 12th, 2022 at 11:15AM.  Please bring this letter with you to the ACL laboratory to ensure Covid testing and blood work are both completed.    If you test positive, you will be notified by your physician, prior to your procedure, who will consider rescheduling or delaying when able; otherwise, the negative result will be relayed to you on the day of your procedure.  If you start to experience any of the symptoms listed below, after you receive your COVID-19 test and prior to your procedure, you are required to call the physician’s clinic to determine if procedure will still occur or be completed.  · Temperature:  Fever > 100.0  · Respiratory Symptoms:  new or worsening cough, shortness of breath, sore throat  · GI Symptoms:  New onset of nausea, vomiting or diarrhea  · New onset of loss of taste or smell  After having your COVID-19 test, and any pre procedure labs drawn at [facility], the expectation is that you return home and self quarantine (remain at  home) until your procedure.  If you are unable to attend your COVID-19 testing appointment on the date listed above, you are required to call your physician to reschedule.        Bring:  · Insurance cards and photo ID  · List of all medications you are currently taking, including the dose and how many times you take them daily.  · Overnight bag with toiletries/necessities.  No valuables.    Pre-Procedure Instructions:  · Do not eat or drink anything after midnight, with the exception of medications..  · You may take all of your prescribed medications (including aspirin) with small sips of water the day of procedure, except for the followin. Start holding your anticoagulant {ANTICOA} on ***.  Your last dose will be taken ***.  Please be sure to notify your Anticoagulation Clinic or managing office with these changes.  2. If you are diabetic, please follow these instructions:     A. Do not take Metformin oral diabetic medication(s) the morning of the procedure 2022.  3. Hold your diuretic (Bumex and Metolazone as well as your potassium) the day of the procedure 2022.   4. Hold Phosphodiesterase type 5 inhibitors (PDE5 inhibitors): Viagra (Sildenafil) 24 hours prior to coronary angiogram on 2022  5. Additional Instructions (include contrast dye prophylaxis if patient has allergy) ***.    Transportation:  · If you receive sedation, you will need to go home with a responsible adult.  For your safety, you will not be allowed to drive yourself home.  Please make necessary arrangements for transportation with family or friends.  A taxi, bus or Uber is not acceptable transportation.  If you do not have transportation arranged there is a chance your procedure could be cancelled.  · Do not drive 24 hours after your procedure.     Insurance:  Please remember to bring along your insurance card(s). Please be aware that although we extend the courtesy of obtaining pre-certification with your  insurance company for your procedure(s), we cannot accept responsibility for obtaining information regarding your insurance referral requirements, in or out of network co-payments, or your individual deductible status. The complexities of insurance plans available today make it difficult for us to obtain accurate information regarding your specific plan. Therefore, obtaining this information is the responsibility of the patient. Please make sure you have a referral to BRISA Potter M.D. if one is needed for insurance coverage. This is obtained through your primary physician.    If any medications have changed from the time of this letter to the date of your procedure, please call the office and advise your nurse of the change. After reviewing all the above information if you have any questions please feel free to contact our office at [phone].    Sincerely,      Office of BRISA Potter M.D.   Doctor's office

## 2025-05-09 NOTE — PROVIDER CONTACT NOTE (OTHER) - ASSESSMENT
Provider asked me to talk to pt and wife if they need any help at home. SW met with pt, and spoke with wife on the phone. Per wife pt is connected to [facility], he visits once a week regularly. Pt and wife does not need any services at home. Wife stated she will come to pick him up when ready. Updated provider.

## 2025-05-09 NOTE — ED ADULT NURSE NOTE - OBJECTIVE STATEMENT
Pt history of HTN, dementia, Hep C, presents from methadone clinic, received methadone dose today, was sent to ED for AMS/restlessness over the past 3 weeks, intermittent as per wife, shortness of breath at facility, currently maintaining spo2 >98% room air.    Patient coming to ED for restlessness x 3 weeks. Patient A&OX4. Breathing non-labored. Denies CP, no SOB noted. Labs sent. Left 20G IVL in place and tolerating well. Patient ambulatory, able to move all extremities. Patient coming to ED for restlessness x 3 weeks. Patient A&OX4. Breathing non-labored. Denies CP, no SOB noted. Labs sent. Left 20G IVL in place and tolerating well. Patient ambulatory, able to move all extremities. Plan of care in progress.

## 2025-05-09 NOTE — ED PROVIDER NOTE - ATTENDING CONTRIBUTION TO CARE
Dr. Restrepo: 80 yo male with HTN, hepatitis C, ?dementia, substance use, on methadone (98 mg daily, received this morning), in ED with "weeks" of restlessness, pt states he feels like he cannot sleep.  No fever, CP/SOB, N/V/D or abdominal pain.  Pt has previously been seen for similar, possibly in the context of missing methadone dose, but he received it this morning.  On exam pt chronically-ill appearing, appears restless, heart RRR, lungs CTAB, abd NTND, extremities without swelling, strength 5/5 in all extremities and skin without rash.  +appearing to have akithisia, but no tremor    I, Maycol Restrepo MD, personally made/approved the management plan for this patient and take responsibility for the patient's management.

## 2025-05-09 NOTE — ED ADULT TRIAGE NOTE - CHIEF COMPLAINT QUOTE
Pt history of HTN, dementia, Hep C, presents from methadone clinic, received methadone dose today, was sent to ED for AMS/restlessness over the past 3 weeks, intermittent as per wife, shortness of breath at facility, currently maintaining spo2 >98% room air.

## 2025-05-09 NOTE — ED ADULT TRIAGE NOTE - PRO INTERPRETER NEED 2
Purposeful rounding completed:    Side rails up x 2:  YES  Bed in low position and wheels locked: YES  Call bell within reach: YES  Comfort addressed: YES    Toileting needs addressed: YES  Plan of care reviewed/updated with patient and or family members: YES  IV site assessed: YES  Pain assessed and addressed: YES, 0    Patient sitting in bed eating breakfast. No distress observed. English

## 2025-05-09 NOTE — ED PROVIDER NOTE - PATIENT PORTAL LINK FT
You can access the FollowMyHealth Patient Portal offered by [facility] by registering at the following website: [email or web address]. By joining [facility]’s FollowMyHealth portal, you will also be able to view your health information using other applications (apps) compatible with our system.

## 2025-05-09 NOTE — ED PROVIDER NOTE - CLINICAL SUMMARY MEDICAL DECISION MAKING FREE TEXT BOX
79-year-old male with history of HTN, hep C, dementia, substance use on methadone (98 mg per patient) presenting with 5 days of restlessness, inability to sleep, diminished appetite.  Per review of chart has presented similarly in the past with concerns regarding dementia or forgetting to take methadone dose.  Triage note states patient has been having 3 weeks of altered mental status and was noted to be short of breath at facility.  Patient himself denies any chest pain, shortness of breath, fevers, cough, congestion, abdominal pain, urinary symptoms, diarrhea.  Denies taking any other medications besides methadone.  No known drug allergies.  Denies any alcohol or other substance use at this time.    Triage vitals notable for /92, heart rate 100, T98.4, O2 98% room air    GENERAL: Awake, alert, appears anxious, akisthesia  HEAD: NC/AT, neck supple, moist mucous membranes,  EYES: PERRL, EOM grossly intact, sclera anicteric  LUNGS: normal WOB on RA, CTAB, no wheezes or crackles   CARDIAC: RRR, no m/r/g, WWP  ABDOMEN: Soft, non tender, non distended, no rebound, no guarding  BACK: No midline spinal tenderness, no CVA tenderness  EXT: Nonpitting edema BLE, no deformities.   NEURO: A&Ox2. Moving all extremities. hyperactive, 5/5 strength in upper and lower extremities bilaterally, no dysmetria, no pronator drift, cranial nerves 2-12 grossly intact, sensation intact to light touch bilaterally   SKIN: Warm and dry. No rash.  PSYCH: anxious affect    Assessment/plan  Possible dementia versus substance withdrawal.  No infectious symptoms to suggest etiology for.  Other possibilities include metabolic derangement.  Confirmed dose of methadone with [facility] methadone clinic, state patient received 97 mg this a.m.  Attempted to call wife for collateral however not available at this time. Will attempt to reach out again. In the meantime will pursue lab workup including CBC, CMP, VBG, tox screen, CXR, EKG, and CTH. 79-year-old male with history of HTN, hep C, dementia, substance use on methadone (98 mg per patient) presenting with 5 days of restlessness, inability to sleep, diminished appetite.  Per review of chart has presented similarly in the past with concerns regarding dementia or forgetting to take methadone dose.  Triage note states patient has been having 3 weeks of altered mental status and was noted to be short of breath at facility.  Patient himself denies any chest pain, shortness of breath, fevers, cough, congestion, abdominal pain, urinary symptoms, diarrhea.  Denies taking any other medications besides methadone.  No known drug allergies.  Denies any alcohol or other substance use at this time.    Triage vitals notable for /92, heart rate 100, T98.4, O2 98% room air    GENERAL: Awake, alert, appears anxious, akithisia  HEAD: NC/AT, neck supple, moist mucous membranes,  EYES: PERRL, EOM grossly intact, sclera anicteric  LUNGS: normal WOB on RA, CTAB, no wheezes or crackles   CARDIAC: RRR, no m/r/g, WWP  ABDOMEN: Soft, non tender, non distended, no rebound, no guarding  BACK: No midline spinal tenderness, no CVA tenderness  EXT: Nonpitting edema BLE, no deformities.   NEURO: A&Ox2. Moving all extremities. hyperactive, 5/5 strength in upper and lower extremities bilaterally, no dysmetria, no pronator drift, cranial nerves 2-12 grossly intact, sensation intact to light touch bilaterally   SKIN: Warm and dry. No rash.  PSYCH: anxious affect    Assessment/plan  Possible dementia versus substance withdrawal.  No infectious symptoms to suggest etiology for.  Other possibilities include metabolic derangement.  Confirmed dose of methadone with [facility] methadone clinic, state patient received 97 mg this a.m.  Attempted to call wife for collateral however not available at this time. Will attempt to reach out again. In the meantime will pursue lab workup including CBC, CMP, VBG, tox screen, CXR, EKG, and CTH.

## 2025-05-09 NOTE — ED PROVIDER NOTE - NSFOLLOWUPINSTRUCTIONS_ED_ALL_ED_FT
You were seen for restlessness, difficulty sleeping, and diminished appetite.    We checked your blood work which was largely reassuring except for some electrolytes which were low including your potassium and your phosphorus.  We repleted these and gave you some oral versions.  Your urine was okay and you did not test positive for a virus.  Your CT scan was negative.  Your chest x-ray showed no signs of pneumonia.    At this time there is no indication to admit you to the hospital and you can manage her care in the outpatient setting.    We had social work speak with you to investigate nursing home placement.  They recommend following up with your primary doctor to arrange this.  We would also recommend you follow-up with the doctors prescribing her methadone to see if you can taper this.    Stay hydrated and eat.    Follow-up with both of these doctors as soon as possible.    Return to the emergency department for the following symptoms: Chest pain, difficulty breathing, confusion, weakness, bloody stools or dark tarry stools, persistent fevers, fast heart rate, facial drooping, slurred speech.

## 2025-05-09 NOTE — ED ADULT NURSE NOTE - NSICDXPASTMEDICALHX_GEN_ALL_CORE_FT
PAST MEDICAL HISTORY:  Drug abuse and dependence Former heroin abuser, on methadone. Last use in 2013, on methadone for 45 years. Followed at [facility] methadone clinic.    Peripheral neuropathy

## 2025-05-09 NOTE — ED PROVIDER NOTE - PROGRESS NOTE DETAILS
Renzo Linares MD PGY-1: patient spoke with Dr. Restrepo, reports he has been taking all his meds including Eliquis Renzo Linares MD PGY-1: confirmed dose of methadone given today, spoke with Dr. Edward Hurtado. Patient noted to be electrolyte deficient, hypokalemic, hypophosphatemic, alkalotic. Will replete, likely requires admission for FTT. Renzo Linares MD PGY-1: patient feeling subjectively better after potassium repletion, started K phos repletion but will give oral dose and dc home. Talked with social work regarding options for placement, they will need to pursue this as an outpatient as we have no medical indication for admission at this time. Spoke with wife regarding resources and who to follow up with including PCP and methadone prescriber for concerns regarding this dose and his dementia. Renzo Linares MD PGY-1: confirmed dose of methadone given today, spoke with Dr. Edward Hurtado. Patient noted to be electrolyte deficient, hypokalemic, hypophosphatemic, alkalotic. Will replete, reassess need for admission vs consideration of discharge if able to connect with wife..

## 2025-05-09 NOTE — ED PROVIDER NOTE - DIFFERENTIAL DIAGNOSIS
medication withdrawal (?methadone, although received this morning), infection, electrolyte abnormality, psychiatric symptom Differential Diagnosis

## 2025-05-09 NOTE — ED PROVIDER NOTE - NSICDXPASTMEDICALHX_GEN_ALL_CORE_FT
PAST MEDICAL HISTORY:  Drug abuse and dependence Former heroin abuser, on methadone. Last use in 2013, on methadone for 45 years. Followed at [facility] methadone clinic.    Peripheral neuropathy

## 2025-05-18 ENCOUNTER — EMERGENCY (EMERGENCY)
Facility: HOSPITAL | Age: 80
LOS: 1 days | End: 2025-05-18
Attending: EMERGENCY MEDICINE | Admitting: EMERGENCY MEDICINE
Payer: MEDICARE

## 2025-05-18 VITALS
SYSTOLIC BLOOD PRESSURE: 170 MMHG | HEART RATE: 71 BPM | OXYGEN SATURATION: 95 % | HEIGHT: 70 IN | TEMPERATURE: 98 F | WEIGHT: 175.05 LBS | DIASTOLIC BLOOD PRESSURE: 53 MMHG | RESPIRATION RATE: 18 BRPM

## 2025-05-18 VITALS
SYSTOLIC BLOOD PRESSURE: 156 MMHG | HEART RATE: 74 BPM | OXYGEN SATURATION: 96 % | DIASTOLIC BLOOD PRESSURE: 75 MMHG | RESPIRATION RATE: 19 BRPM

## 2025-05-18 PROCEDURE — 99283 EMERGENCY DEPT VISIT LOW MDM: CPT

## 2025-05-18 RX ORDER — METHADONE HCL 10 MG
100 TABLET ORAL ONCE
Refills: 0 | Status: DISCONTINUED | OUTPATIENT
Start: 2025-05-18 | End: 2025-05-18

## 2025-05-18 RX ADMIN — Medication 100 MILLIGRAM(S): at 07:06

## 2025-05-18 NOTE — ED PROVIDER NOTE - NSICDXPASTMEDICALHX_GEN_ALL_CORE_FT
PAST MEDICAL HISTORY:  Drug abuse and dependence Former heroin abuser, on methadone. Last use in 2013, on methadone for 45 years. Followed at [facility] methadone clinic.    Peripheral neuropathy

## 2025-05-18 NOTE — ED PROVIDER NOTE - PROGRESS NOTE DETAILS
Dr. Ghulam BARRAGAN: confirmed with inhouse pharmacy that pt did not receive take home dose. confirmed dose at 100 mg

## 2025-05-18 NOTE — ED PROVIDER NOTE - CLINICAL SUMMARY MEDICAL DECISION MAKING FREE TEXT BOX
Emergency Department Note    Chief Complaint:  - Missed methadone dose    Past Medical History:  - Methadone use for 40 years    Medications:  - Methadone 100mg daily    History of Present Illness:  - Missed methadone dose this morning  - No signs of withdrawal  - No other acute complaints  - No recent heroin use, has not used in prolonged period of time  - Extensive history of coming to hospital for similar issues    Physical Examination:  - General appearance: comfortable in bed  - Vital signs stable  - Head/Neck: normal  - Heart: normal S1, S2  - Lungs: clear to auscultation bilaterally  - Abdomen: soft    Impression/Plan:  - Missed methadone dose  - Confirm methadone via pharmacy, provide home dosing  - Discharge, follow up with [facility]

## 2025-05-18 NOTE — ED ADULT NURSE NOTE - OBJECTIVE STATEMENT
Pt received to 26A. Pt is a 79 year old male w. hx of heroin abuse on methadone. Pt present  to ED states he needs a dose of methadone as his methadone clinic is closed today. pt denies any chest pain, sob, headache/dizziness, n/v/d, fevers/chills, or urinary symptoms. Pt A&Ox4. No respiratory distress noted. respirations even and unlabored. Spontaneous movement of all extremities. Medications administered as ordered.

## 2025-05-18 NOTE — ED PROVIDER NOTE - NSFOLLOWUPINSTRUCTIONS_ED_ALL_ED_FT
Methadone Treatment for Opioid Use Disorder    AMBULATORY CARE:    Methadone is medicine used daily to help reduce opioid cravings and make the symptoms of withdrawal less severe. Methadone is provided as part of an opioid treatment program (OTP). Your prescription may be for up to 3 days of home use between OTP appointments. You may be given methadone as a liquid or powder, or as diskettes. Treatment is recommended for at least 12 months.    Call your local emergency number (911 in the [place]) or have someone call if:    You have chest pain or trouble breathing.    You have a seizure.    You cannot be woken.    You used a larger dose of methadone than your prescription said to use.    You have signs of an allergic reaction, such as swelling of your face or mouth, a rash, and severe breathing problems.  Seek care immediately if:    Your breathing is slow or shallow.    You have a fast, pounding, slow, or irregular heartbeat.    You have pale or cold skin.    You feel lightheaded or faint.    Your speech is slurred, you have a hallucination, or you are confused.  Call your doctor or OTP counselor if:    You feel restless or are sweating heavily.    You have nausea or are vomiting.    You have questions or concerns about your condition or care.  What you need to know about methadone safety:    You must continue to participate in your OTP. Methadone is meant to be used with counseling, medical care, and other services to help you manage OUD. Continue to go to all OTP appointments.    Use methadone exactly as directed. Use only the amount you are prescribed, at the correct time each day. Follow directions carefully so you do not get too much medicine at one time. Methadone reduces opioid cravings for 24 to 36 hours. If you miss a dose or do not think the methadone is working, do not double the next dose. Too much can cause life-threatening poisoning. Call your healthcare provider or OTP counselor right away for instructions on when to take the next dose.    Do not mix methadone with other medicines or alcohol. The combination can cause poisoning, or cause you to stop breathing. Alcohol, sleeping pills, and medicines such as antihistamines can make you sleepy. A combination with methadone can be life-threatening.    Be careful if you must drive or operate heavy machinery after you use methadone. You may feel drowsy or have trouble concentrating. You can injure yourself or others if you drive or use heavy machinery when you are not alert. Your provider can tell you how long to wait after a dose before you do these activities.    Ask how to manage side effects safely. Common side effects of methadone are constipation, nausea or vomiting, and itchy skin. Methadone can also cause serious side effects, such as chest pain, a fast and pounding heartbeat, a rash, and face or mouth swelling. Stop using methadone and seek immediate care for any serious side effect. Your close contacts also need to know signs to watch for and what to do if you develop any.    Do not give methadone to others or use methadone that belongs to someone else. Methadone can only be given by a healthcare provider through an OTP. The amount prescribed for one person may not be right for another person.    Talk to your provider about family planning. Methadone is safe to use during pregnancy and breastfeeding. If you are male, ask if methadone may affect your ability to get your partner pregnant.    Work with your provider if you want to stop using methadone. Your provider will create a plan to help you taper (slowly stop) methadone use. This will help prevent withdrawal symptoms. Do not use opioids, alcohol, or street drugs to prevent or relieve withdrawal symptoms.  How to store methadone: Your prescription will have specific instructions. The following are general guidelines:    Keep methadone in the original packaging. Do not put methadone in a pill box or other container.    Store methadone where others cannot easily get to it. Keep it in a locked cabinet or secure area. Do not keep methadone in a purse or other bag you carry with you. Make sure methadone is stored out of the reach of children.  Common Childproofing Latches       Store methadone at room temperature. Keep the medicine away from heat, moisture, and direct light. Do not put methadone in the freezer.  How to dispose of methadone: The laws vary by country and area. Talk to your healthcare provider about how to dispose of methadone that  or you did not use. Follow instructions carefully. The following are general guidelines:    Take any methadone you did not use to a drug take-back location right away. In the [place], this program is offered by the [place] Drug Enforcement Agency ([employer]). Certain facilities are authorized to receive methadone. Examples include a [employer] collection site, or an approved pharmacy or hospital. Ask your provider about take-back locations in your area. You can also check the FDA safe disposal of medicines website: www.fda.gov/drugs/resourcesforyou/consumers/buyingusingmedicinesafely/ensuringsafeuseofmedicine/safedisposalofmedicines/znz695930.htm.    Ask about if you can flush the methadone. If you cannot flush the medicine, ask your waste management company about rules for putting methadone in the trash. Scratch out personal information on the original medicine label so it cannot be read. Then put it in the trash. Do not label the trash or put any information on it about the methadone. It should look like regular household trash so no one is tempted to look for methadone. Keep the trash out of the reach of children and animals. Always make sure trash is secure.

## 2025-05-18 NOTE — ED ADULT NURSE NOTE - NSICDXPASTMEDICALHX_GEN_ALL_CORE_FT
PAST MEDICAL HISTORY:  Drug abuse and dependence Former heroin abuser, on methadone. Last use in 2013, on methadone for 45 years. Followed at [facility] methadone clinic.    Peripheral neuropathy

## 2025-07-10 ENCOUNTER — OUTPATIENT (OUTPATIENT)
Dept: OUTPATIENT SERVICES | Facility: HOSPITAL | Age: 80
LOS: 1 days | Discharge: HOME | End: 2025-07-10
Payer: MEDICARE

## 2025-07-10 PROCEDURE — 90792 PSYCH DIAG EVAL W/MED SRVCS: CPT

## 2025-08-14 DIAGNOSIS — F33.9 MAJOR DEPRESSIVE DISORDER, RECURRENT, UNSPECIFIED: ICD-10-CM

## 2025-08-18 ENCOUNTER — INPATIENT (INPATIENT)
Facility: HOSPITAL | Age: 80
LOS: 3 days | Discharge: NURSING, REHAB OR LONG-TERM CARE | DRG: 299 | End: 2025-08-22
Attending: HOSPITALIST | Admitting: STUDENT IN AN ORGANIZED HEALTH CARE EDUCATION/TRAINING PROGRAM
Payer: MEDICARE

## 2025-08-18 VITALS
TEMPERATURE: 98 F | SYSTOLIC BLOOD PRESSURE: 113 MMHG | WEIGHT: 160.06 LBS | HEART RATE: 73 BPM | DIASTOLIC BLOOD PRESSURE: 67 MMHG | OXYGEN SATURATION: 85 % | HEIGHT: 70 IN | RESPIRATION RATE: 20 BRPM

## 2025-08-18 LAB
ALBUMIN SERPL ELPH-MCNC: 3.8 G/DL — SIGNIFICANT CHANGE UP (ref 3.3–5)
ALP SERPL-CCNC: 67 U/L — SIGNIFICANT CHANGE UP (ref 40–120)
ALT FLD-CCNC: 28 U/L — SIGNIFICANT CHANGE UP (ref 10–45)
ANION GAP SERPL CALC-SCNC: 14 MMOL/L — SIGNIFICANT CHANGE UP (ref 5–17)
APPEARANCE UR: CLEAR — SIGNIFICANT CHANGE UP
APTT BLD: 28.5 SEC — SIGNIFICANT CHANGE UP (ref 26.1–36.8)
AST SERPL-CCNC: 43 U/L — HIGH (ref 10–40)
BACTERIA # UR AUTO: NEGATIVE /HPF — SIGNIFICANT CHANGE UP
BASOPHILS # BLD AUTO: 0.04 K/UL — SIGNIFICANT CHANGE UP (ref 0–0.2)
BASOPHILS NFR BLD AUTO: 0.4 % — SIGNIFICANT CHANGE UP (ref 0–2)
BILIRUB SERPL-MCNC: 1 MG/DL — SIGNIFICANT CHANGE UP (ref 0.2–1.2)
BILIRUB UR-MCNC: NEGATIVE — SIGNIFICANT CHANGE UP
BUN SERPL-MCNC: 41 MG/DL — HIGH (ref 7–23)
CALCIUM SERPL-MCNC: 10 MG/DL — SIGNIFICANT CHANGE UP (ref 8.4–10.5)
CAST: 0 /LPF — SIGNIFICANT CHANGE UP (ref 0–4)
CHLORIDE SERPL-SCNC: 88 MMOL/L — LOW (ref 96–108)
CO2 SERPL-SCNC: 31 MMOL/L — SIGNIFICANT CHANGE UP (ref 22–31)
COLOR SPEC: YELLOW — SIGNIFICANT CHANGE UP
CREAT SERPL-MCNC: 1.4 MG/DL — HIGH (ref 0.5–1.3)
DIFF PNL FLD: NEGATIVE — SIGNIFICANT CHANGE UP
EGFR: 51 ML/MIN/1.73M2 — LOW
EGFR: 51 ML/MIN/1.73M2 — LOW
EOSINOPHIL # BLD AUTO: 0.2 K/UL — SIGNIFICANT CHANGE UP (ref 0–0.5)
EOSINOPHIL NFR BLD AUTO: 2.1 % — SIGNIFICANT CHANGE UP (ref 0–6)
FLUAV AG NPH QL: SIGNIFICANT CHANGE UP
FLUBV AG NPH QL: SIGNIFICANT CHANGE UP
GAS PNL BLDV: SIGNIFICANT CHANGE UP
GLUCOSE SERPL-MCNC: 116 MG/DL — HIGH (ref 70–99)
GLUCOSE UR QL: NEGATIVE MG/DL — SIGNIFICANT CHANGE UP
HCT VFR BLD CALC: 39.8 % — SIGNIFICANT CHANGE UP (ref 39–50)
HGB BLD-MCNC: 13.1 G/DL — SIGNIFICANT CHANGE UP (ref 13–17)
IMM GRANULOCYTES # BLD AUTO: 0.04 K/UL — SIGNIFICANT CHANGE UP (ref 0–0.07)
IMM GRANULOCYTES NFR BLD AUTO: 0.4 % — SIGNIFICANT CHANGE UP (ref 0–0.9)
INR BLD: 1.32 RATIO — HIGH (ref 0.85–1.16)
KETONES UR QL: NEGATIVE MG/DL — SIGNIFICANT CHANGE UP
LEUKOCYTE ESTERASE UR-ACNC: ABNORMAL
LYMPHOCYTES # BLD AUTO: 1.16 K/UL — SIGNIFICANT CHANGE UP (ref 1–3.3)
LYMPHOCYTES NFR BLD AUTO: 12 % — LOW (ref 13–44)
MCHC RBC-ENTMCNC: 30.3 PG — SIGNIFICANT CHANGE UP (ref 27–34)
MCHC RBC-ENTMCNC: 32.9 G/DL — SIGNIFICANT CHANGE UP (ref 32–36)
MCV RBC AUTO: 91.9 FL — SIGNIFICANT CHANGE UP (ref 80–100)
MONOCYTES # BLD AUTO: 0.93 K/UL — HIGH (ref 0–0.9)
MONOCYTES NFR BLD AUTO: 9.6 % — SIGNIFICANT CHANGE UP (ref 2–14)
NEUTROPHILS # BLD AUTO: 7.32 K/UL — SIGNIFICANT CHANGE UP (ref 1.8–7.4)
NEUTROPHILS NFR BLD AUTO: 75.5 % — SIGNIFICANT CHANGE UP (ref 43–77)
NITRITE UR-MCNC: NEGATIVE — SIGNIFICANT CHANGE UP
NRBC # BLD AUTO: 0 K/UL — SIGNIFICANT CHANGE UP (ref 0–0)
NRBC # FLD: 0 K/UL — SIGNIFICANT CHANGE UP (ref 0–0)
NRBC BLD AUTO-RTO: 0 /100 WBCS — SIGNIFICANT CHANGE UP (ref 0–0)
NT-PROBNP SERPL-SCNC: 158 PG/ML — SIGNIFICANT CHANGE UP (ref 0–300)
PH UR: 7 — SIGNIFICANT CHANGE UP (ref 5–8)
PLATELET # BLD AUTO: 254 K/UL — SIGNIFICANT CHANGE UP (ref 150–400)
PMV BLD: 9.9 FL — SIGNIFICANT CHANGE UP (ref 7–13)
POTASSIUM SERPL-MCNC: 3.6 MMOL/L — SIGNIFICANT CHANGE UP (ref 3.5–5.3)
POTASSIUM SERPL-SCNC: 3.6 MMOL/L — SIGNIFICANT CHANGE UP (ref 3.5–5.3)
PROT SERPL-MCNC: 8.5 G/DL — HIGH (ref 6–8.3)
PROT UR-MCNC: SIGNIFICANT CHANGE UP MG/DL
PROTHROM AB SERPL-ACNC: 15.3 SEC — HIGH (ref 9.9–13.4)
RBC # BLD: 4.33 M/UL — SIGNIFICANT CHANGE UP (ref 4.2–5.8)
RBC # FLD: 11.9 % — SIGNIFICANT CHANGE UP (ref 10.3–14.5)
RBC CASTS # UR COMP ASSIST: 4 /HPF — SIGNIFICANT CHANGE UP (ref 0–4)
RSV RNA NPH QL NAA+NON-PROBE: SIGNIFICANT CHANGE UP
SARS-COV-2 RNA SPEC QL NAA+PROBE: SIGNIFICANT CHANGE UP
SODIUM SERPL-SCNC: 133 MMOL/L — LOW (ref 135–145)
SOURCE RESPIRATORY: SIGNIFICANT CHANGE UP
SP GR SPEC: 1.02 — SIGNIFICANT CHANGE UP (ref 1–1.03)
SQUAMOUS # UR AUTO: 0 /HPF — SIGNIFICANT CHANGE UP (ref 0–5)
TROPONIN T, HIGH SENSITIVITY RESULT: 102 NG/L — HIGH (ref 0–51)
TROPONIN T, HIGH SENSITIVITY RESULT: 93 NG/L — HIGH (ref 0–51)
UROBILINOGEN FLD QL: 1 MG/DL — SIGNIFICANT CHANGE UP (ref 0.2–1)
WBC # BLD: 9.69 K/UL — SIGNIFICANT CHANGE UP (ref 3.8–10.5)
WBC # FLD AUTO: 9.69 K/UL — SIGNIFICANT CHANGE UP (ref 3.8–10.5)
WBC UR QL: 0 /HPF — SIGNIFICANT CHANGE UP (ref 0–5)

## 2025-08-18 PROCEDURE — 71045 X-RAY EXAM CHEST 1 VIEW: CPT | Mod: 26

## 2025-08-18 PROCEDURE — 99285 EMERGENCY DEPT VISIT HI MDM: CPT

## 2025-08-18 PROCEDURE — 93971 EXTREMITY STUDY: CPT | Mod: 26,LT

## 2025-08-18 PROCEDURE — 71275 CT ANGIOGRAPHY CHEST: CPT | Mod: 26

## 2025-08-18 PROCEDURE — 93010 ELECTROCARDIOGRAM REPORT: CPT

## 2025-08-18 RX ORDER — HEPARIN SODIUM 1000 [USP'U]/ML
INJECTION INTRAVENOUS; SUBCUTANEOUS
Qty: 25000 | Refills: 0 | Status: DISCONTINUED | OUTPATIENT
Start: 2025-08-18 | End: 2025-08-18

## 2025-08-18 RX ORDER — HEPARIN SODIUM 1000 [USP'U]/ML
5000 INJECTION INTRAVENOUS; SUBCUTANEOUS EVERY 6 HOURS
Refills: 0 | Status: DISCONTINUED | OUTPATIENT
Start: 2025-08-18 | End: 2025-08-19

## 2025-08-18 RX ORDER — HEPARIN SODIUM 1000 [USP'U]/ML
3000 INJECTION INTRAVENOUS; SUBCUTANEOUS EVERY 6 HOURS
Refills: 0 | Status: DISCONTINUED | OUTPATIENT
Start: 2025-08-18 | End: 2025-08-18

## 2025-08-18 RX ORDER — HEPARIN SODIUM 1000 [USP'U]/ML
5000 INJECTION INTRAVENOUS; SUBCUTANEOUS ONCE
Refills: 0 | Status: COMPLETED | OUTPATIENT
Start: 2025-08-18 | End: 2025-08-18

## 2025-08-18 RX ORDER — HEPARIN SODIUM 1000 [USP'U]/ML
2500 INJECTION INTRAVENOUS; SUBCUTANEOUS EVERY 6 HOURS
Refills: 0 | Status: DISCONTINUED | OUTPATIENT
Start: 2025-08-18 | End: 2025-08-19

## 2025-08-18 RX ORDER — HEPARIN SODIUM 1000 [USP'U]/ML
INJECTION INTRAVENOUS; SUBCUTANEOUS
Qty: 25000 | Refills: 0 | Status: DISCONTINUED | OUTPATIENT
Start: 2025-08-18 | End: 2025-08-19

## 2025-08-18 RX ORDER — HEPARIN SODIUM 1000 [USP'U]/ML
6000 INJECTION INTRAVENOUS; SUBCUTANEOUS EVERY 6 HOURS
Refills: 0 | Status: DISCONTINUED | OUTPATIENT
Start: 2025-08-18 | End: 2025-08-18

## 2025-08-18 RX ORDER — HEPARIN SODIUM 1000 [USP'U]/ML
6000 INJECTION INTRAVENOUS; SUBCUTANEOUS ONCE
Refills: 0 | Status: DISCONTINUED | OUTPATIENT
Start: 2025-08-18 | End: 2025-08-18

## 2025-08-18 RX ADMIN — HEPARIN SODIUM 5000 UNIT(S): 1000 INJECTION INTRAVENOUS; SUBCUTANEOUS at 20:27

## 2025-08-18 RX ADMIN — HEPARIN SODIUM 1100 UNIT(S)/HR: 1000 INJECTION INTRAVENOUS; SUBCUTANEOUS at 20:28

## 2025-08-19 ENCOUNTER — RESULT REVIEW (OUTPATIENT)
Age: 80
End: 2025-08-19

## 2025-08-19 DIAGNOSIS — I82.90 ACUTE EMBOLISM AND THROMBOSIS OF UNSPECIFIED VEIN: ICD-10-CM

## 2025-08-19 DIAGNOSIS — Z98.890 OTHER SPECIFIED POSTPROCEDURAL STATES: Chronic | ICD-10-CM

## 2025-08-19 DIAGNOSIS — R62.7 ADULT FAILURE TO THRIVE: ICD-10-CM

## 2025-08-19 DIAGNOSIS — J96.01 ACUTE RESPIRATORY FAILURE WITH HYPOXIA: ICD-10-CM

## 2025-08-19 DIAGNOSIS — F11.20 OPIOID DEPENDENCE, UNCOMPLICATED: ICD-10-CM

## 2025-08-19 DIAGNOSIS — I26.99 OTHER PULMONARY EMBOLISM WITHOUT ACUTE COR PULMONALE: ICD-10-CM

## 2025-08-19 LAB
APTT BLD: 59 SEC — HIGH (ref 26.1–36.8)
APTT BLD: 61.3 SEC — HIGH (ref 26.1–36.8)
BLD GP AB SCN SERPL QL: NEGATIVE — SIGNIFICANT CHANGE UP
HCT VFR BLD CALC: 34.3 % — LOW (ref 39–50)
HCT VFR BLD CALC: 34.5 % — LOW (ref 39–50)
HGB BLD-MCNC: 11 G/DL — LOW (ref 13–17)
HGB BLD-MCNC: 11.1 G/DL — LOW (ref 13–17)
MCHC RBC-ENTMCNC: 29.7 PG — SIGNIFICANT CHANGE UP (ref 27–34)
MCHC RBC-ENTMCNC: 30 PG — SIGNIFICANT CHANGE UP (ref 27–34)
MCHC RBC-ENTMCNC: 31.9 G/DL — LOW (ref 32–36)
MCHC RBC-ENTMCNC: 32.4 G/DL — SIGNIFICANT CHANGE UP (ref 32–36)
MCV RBC AUTO: 92.7 FL — SIGNIFICANT CHANGE UP (ref 80–100)
MCV RBC AUTO: 93.2 FL — SIGNIFICANT CHANGE UP (ref 80–100)
NRBC # BLD AUTO: 0 K/UL — SIGNIFICANT CHANGE UP (ref 0–0)
NRBC # BLD AUTO: 0 K/UL — SIGNIFICANT CHANGE UP (ref 0–0)
NRBC # FLD: 0 K/UL — SIGNIFICANT CHANGE UP (ref 0–0)
NRBC # FLD: 0 K/UL — SIGNIFICANT CHANGE UP (ref 0–0)
NRBC BLD AUTO-RTO: 0 /100 WBCS — SIGNIFICANT CHANGE UP (ref 0–0)
NRBC BLD AUTO-RTO: 0 /100 WBCS — SIGNIFICANT CHANGE UP (ref 0–0)
PLATELET # BLD AUTO: 196 K/UL — SIGNIFICANT CHANGE UP (ref 150–400)
PLATELET # BLD AUTO: 205 K/UL — SIGNIFICANT CHANGE UP (ref 150–400)
PMV BLD: 10 FL — SIGNIFICANT CHANGE UP (ref 7–13)
PMV BLD: 10 FL — SIGNIFICANT CHANGE UP (ref 7–13)
RBC # BLD: 3.7 M/UL — LOW (ref 4.2–5.8)
RBC # BLD: 3.7 M/UL — LOW (ref 4.2–5.8)
RBC # FLD: 12 % — SIGNIFICANT CHANGE UP (ref 10.3–14.5)
RBC # FLD: 12 % — SIGNIFICANT CHANGE UP (ref 10.3–14.5)
RH IG SCN BLD-IMP: POSITIVE — SIGNIFICANT CHANGE UP
WBC # BLD: 7.09 K/UL — SIGNIFICANT CHANGE UP (ref 3.8–10.5)
WBC # BLD: 8.72 K/UL — SIGNIFICANT CHANGE UP (ref 3.8–10.5)
WBC # FLD AUTO: 7.09 K/UL — SIGNIFICANT CHANGE UP (ref 3.8–10.5)
WBC # FLD AUTO: 8.72 K/UL — SIGNIFICANT CHANGE UP (ref 3.8–10.5)

## 2025-08-19 PROCEDURE — 86900 BLOOD TYPING SEROLOGIC ABO: CPT

## 2025-08-19 PROCEDURE — 99223 1ST HOSP IP/OBS HIGH 75: CPT

## 2025-08-19 PROCEDURE — 85025 COMPLETE CBC W/AUTO DIFF WBC: CPT

## 2025-08-19 PROCEDURE — 82803 BLOOD GASES ANY COMBINATION: CPT

## 2025-08-19 PROCEDURE — 86901 BLOOD TYPING SEROLOGIC RH(D): CPT

## 2025-08-19 PROCEDURE — 84484 ASSAY OF TROPONIN QUANT: CPT

## 2025-08-19 PROCEDURE — 93010 ELECTROCARDIOGRAM REPORT: CPT | Mod: 76

## 2025-08-19 PROCEDURE — 84295 ASSAY OF SERUM SODIUM: CPT

## 2025-08-19 PROCEDURE — 85610 PROTHROMBIN TIME: CPT

## 2025-08-19 PROCEDURE — 83880 ASSAY OF NATRIURETIC PEPTIDE: CPT

## 2025-08-19 PROCEDURE — 71045 X-RAY EXAM CHEST 1 VIEW: CPT

## 2025-08-19 PROCEDURE — 85018 HEMOGLOBIN: CPT

## 2025-08-19 PROCEDURE — 82947 ASSAY GLUCOSE BLOOD QUANT: CPT

## 2025-08-19 PROCEDURE — 87637 SARSCOV2&INF A&B&RSV AMP PRB: CPT

## 2025-08-19 PROCEDURE — 85014 HEMATOCRIT: CPT

## 2025-08-19 PROCEDURE — 83605 ASSAY OF LACTIC ACID: CPT

## 2025-08-19 PROCEDURE — 71275 CT ANGIOGRAPHY CHEST: CPT

## 2025-08-19 PROCEDURE — 82330 ASSAY OF CALCIUM: CPT

## 2025-08-19 PROCEDURE — 84132 ASSAY OF SERUM POTASSIUM: CPT

## 2025-08-19 PROCEDURE — 87040 BLOOD CULTURE FOR BACTERIA: CPT

## 2025-08-19 PROCEDURE — 36415 COLL VENOUS BLD VENIPUNCTURE: CPT

## 2025-08-19 PROCEDURE — 82435 ASSAY OF BLOOD CHLORIDE: CPT

## 2025-08-19 PROCEDURE — 85027 COMPLETE CBC AUTOMATED: CPT

## 2025-08-19 PROCEDURE — 76376 3D RENDER W/INTRP POSTPROCES: CPT | Mod: 26

## 2025-08-19 PROCEDURE — 81001 URINALYSIS AUTO W/SCOPE: CPT

## 2025-08-19 PROCEDURE — 93356 MYOCRD STRAIN IMG SPCKL TRCK: CPT

## 2025-08-19 PROCEDURE — 85730 THROMBOPLASTIN TIME PARTIAL: CPT

## 2025-08-19 PROCEDURE — 86850 RBC ANTIBODY SCREEN: CPT

## 2025-08-19 PROCEDURE — 80053 COMPREHEN METABOLIC PANEL: CPT

## 2025-08-19 PROCEDURE — 93306 TTE W/DOPPLER COMPLETE: CPT | Mod: 26

## 2025-08-19 PROCEDURE — 93971 EXTREMITY STUDY: CPT

## 2025-08-19 RX ORDER — MELATONIN 5 MG
3 TABLET ORAL AT BEDTIME
Refills: 0 | Status: DISCONTINUED | OUTPATIENT
Start: 2025-08-19 | End: 2025-08-22

## 2025-08-19 RX ORDER — METHADONE HCL 10 MG
95 TABLET ORAL DAILY
Refills: 0 | Status: DISCONTINUED | OUTPATIENT
Start: 2025-08-19 | End: 2025-08-22

## 2025-08-19 RX ORDER — MAGNESIUM, ALUMINUM HYDROXIDE 200-200 MG
30 TABLET,CHEWABLE ORAL EVERY 4 HOURS
Refills: 0 | Status: DISCONTINUED | OUTPATIENT
Start: 2025-08-19 | End: 2025-08-22

## 2025-08-19 RX ORDER — METHADONE HCL 10 MG
90 TABLET ORAL DAILY
Refills: 0 | Status: DISCONTINUED | OUTPATIENT
Start: 2025-08-19 | End: 2025-08-19

## 2025-08-19 RX ORDER — NIFEDIPINE 30 MG
1 TABLET, EXTENDED RELEASE 24 HR ORAL
Refills: 0 | DISCHARGE

## 2025-08-19 RX ORDER — ENOXAPARIN SODIUM 100 MG/ML
60 INJECTION SUBCUTANEOUS EVERY 12 HOURS
Refills: 0 | Status: DISCONTINUED | OUTPATIENT
Start: 2025-08-19 | End: 2025-08-21

## 2025-08-19 RX ORDER — ACETAMINOPHEN 500 MG/5ML
650 LIQUID (ML) ORAL EVERY 6 HOURS
Refills: 0 | Status: DISCONTINUED | OUTPATIENT
Start: 2025-08-19 | End: 2025-08-22

## 2025-08-19 RX ORDER — ONDANSETRON HCL/PF 4 MG/2 ML
4 VIAL (ML) INJECTION EVERY 8 HOURS
Refills: 0 | Status: DISCONTINUED | OUTPATIENT
Start: 2025-08-19 | End: 2025-08-22

## 2025-08-19 RX ADMIN — HEPARIN SODIUM 1100 UNIT(S)/HR: 1000 INJECTION INTRAVENOUS; SUBCUTANEOUS at 02:43

## 2025-08-19 RX ADMIN — ENOXAPARIN SODIUM 60 MILLIGRAM(S): 100 INJECTION SUBCUTANEOUS at 17:24

## 2025-08-19 RX ADMIN — HEPARIN SODIUM 1100 UNIT(S)/HR: 1000 INJECTION INTRAVENOUS; SUBCUTANEOUS at 12:33

## 2025-08-19 RX ADMIN — Medication 95 MILLIGRAM(S): at 16:00

## 2025-08-19 RX ADMIN — HEPARIN SODIUM 1100 UNIT(S)/HR: 1000 INJECTION INTRAVENOUS; SUBCUTANEOUS at 03:57

## 2025-08-20 LAB
ANION GAP SERPL CALC-SCNC: 10 MMOL/L — SIGNIFICANT CHANGE UP (ref 5–17)
ANION GAP SERPL CALC-SCNC: 13 MMOL/L — SIGNIFICANT CHANGE UP (ref 5–17)
APTT BLD: 29.4 SEC — SIGNIFICANT CHANGE UP (ref 26.1–36.8)
APTT BLD: 31 SEC — SIGNIFICANT CHANGE UP (ref 26.1–36.8)
B2 GLYCOPROT1 IGA SER QL: 2 U/ML — SIGNIFICANT CHANGE UP
B2 GLYCOPROT1 IGG SER-ACNC: 2.3 U/ML — SIGNIFICANT CHANGE UP
B2 GLYCOPROT1 IGM SER-ACNC: 24.6 U/ML — HIGH
BUN SERPL-MCNC: 29 MG/DL — HIGH (ref 7–23)
BUN SERPL-MCNC: 31 MG/DL — HIGH (ref 7–23)
CALCIUM SERPL-MCNC: 8.9 MG/DL — SIGNIFICANT CHANGE UP (ref 8.4–10.5)
CALCIUM SERPL-MCNC: 9.6 MG/DL — SIGNIFICANT CHANGE UP (ref 8.4–10.5)
CARDIOLIPIN IGM SER-MCNC: 4 MPL U/ML — SIGNIFICANT CHANGE UP
CARDIOLIPIN IGM SER-MCNC: <1.6 GPL U/ML — SIGNIFICANT CHANGE UP
CHLORIDE SERPL-SCNC: 92 MMOL/L — LOW (ref 96–108)
CHLORIDE SERPL-SCNC: 93 MMOL/L — LOW (ref 96–108)
CO2 SERPL-SCNC: 31 MMOL/L — SIGNIFICANT CHANGE UP (ref 22–31)
CO2 SERPL-SCNC: 33 MMOL/L — HIGH (ref 22–31)
CREAT SERPL-MCNC: 1.13 MG/DL — SIGNIFICANT CHANGE UP (ref 0.5–1.3)
CREAT SERPL-MCNC: 1.15 MG/DL — SIGNIFICANT CHANGE UP (ref 0.5–1.3)
DEPRECATED CARDIOLIPIN IGA SER: <2 APL U/ML — SIGNIFICANT CHANGE UP
EGFR: 65 ML/MIN/1.73M2 — SIGNIFICANT CHANGE UP
EGFR: 65 ML/MIN/1.73M2 — SIGNIFICANT CHANGE UP
EGFR: 66 ML/MIN/1.73M2 — SIGNIFICANT CHANGE UP
EGFR: 66 ML/MIN/1.73M2 — SIGNIFICANT CHANGE UP
GLUCOSE SERPL-MCNC: 85 MG/DL — SIGNIFICANT CHANGE UP (ref 70–99)
GLUCOSE SERPL-MCNC: 92 MG/DL — SIGNIFICANT CHANGE UP (ref 70–99)
HCT VFR BLD CALC: 37.6 % — LOW (ref 39–50)
HGB BLD-MCNC: 12 G/DL — LOW (ref 13–17)
MAGNESIUM SERPL-MCNC: 1.9 MG/DL — SIGNIFICANT CHANGE UP (ref 1.6–2.6)
MCHC RBC-ENTMCNC: 30.2 PG — SIGNIFICANT CHANGE UP (ref 27–34)
MCHC RBC-ENTMCNC: 31.9 G/DL — LOW (ref 32–36)
MCV RBC AUTO: 94.7 FL — SIGNIFICANT CHANGE UP (ref 80–100)
NRBC # BLD AUTO: 0 K/UL — SIGNIFICANT CHANGE UP (ref 0–0)
NRBC # FLD: 0 K/UL — SIGNIFICANT CHANGE UP (ref 0–0)
NRBC BLD AUTO-RTO: 0 /100 WBCS — SIGNIFICANT CHANGE UP (ref 0–0)
PLATELET # BLD AUTO: 227 K/UL — SIGNIFICANT CHANGE UP (ref 150–400)
PMV BLD: 10.5 FL — SIGNIFICANT CHANGE UP (ref 7–13)
POTASSIUM SERPL-MCNC: 3.2 MMOL/L — LOW (ref 3.5–5.3)
POTASSIUM SERPL-MCNC: 4.3 MMOL/L — SIGNIFICANT CHANGE UP (ref 3.5–5.3)
POTASSIUM SERPL-SCNC: 3.2 MMOL/L — LOW (ref 3.5–5.3)
POTASSIUM SERPL-SCNC: 4.3 MMOL/L — SIGNIFICANT CHANGE UP (ref 3.5–5.3)
PSA FLD-MCNC: 0.27 NG/ML — SIGNIFICANT CHANGE UP (ref 0–4)
RBC # BLD: 3.97 M/UL — LOW (ref 4.2–5.8)
RBC # FLD: 11.9 % — SIGNIFICANT CHANGE UP (ref 10.3–14.5)
SODIUM SERPL-SCNC: 135 MMOL/L — SIGNIFICANT CHANGE UP (ref 135–145)
SODIUM SERPL-SCNC: 137 MMOL/L — SIGNIFICANT CHANGE UP (ref 135–145)
WBC # BLD: 6.86 K/UL — SIGNIFICANT CHANGE UP (ref 3.8–10.5)
WBC # FLD AUTO: 6.86 K/UL — SIGNIFICANT CHANGE UP (ref 3.8–10.5)

## 2025-08-20 PROCEDURE — 85014 HEMATOCRIT: CPT

## 2025-08-20 PROCEDURE — 99233 SBSQ HOSP IP/OBS HIGH 50: CPT

## 2025-08-20 PROCEDURE — 74177 CT ABD & PELVIS W/CONTRAST: CPT

## 2025-08-20 PROCEDURE — 85730 THROMBOPLASTIN TIME PARTIAL: CPT

## 2025-08-20 PROCEDURE — 85018 HEMOGLOBIN: CPT

## 2025-08-20 PROCEDURE — 93005 ELECTROCARDIOGRAM TRACING: CPT

## 2025-08-20 PROCEDURE — 84484 ASSAY OF TROPONIN QUANT: CPT

## 2025-08-20 PROCEDURE — 74177 CT ABD & PELVIS W/CONTRAST: CPT | Mod: 26

## 2025-08-20 PROCEDURE — 82435 ASSAY OF BLOOD CHLORIDE: CPT

## 2025-08-20 PROCEDURE — 71045 X-RAY EXAM CHEST 1 VIEW: CPT

## 2025-08-20 PROCEDURE — 93306 TTE W/DOPPLER COMPLETE: CPT

## 2025-08-20 PROCEDURE — 82947 ASSAY GLUCOSE BLOOD QUANT: CPT

## 2025-08-20 PROCEDURE — 85613 RUSSELL VIPER VENOM DILUTED: CPT

## 2025-08-20 PROCEDURE — 97161 PT EVAL LOW COMPLEX 20 MIN: CPT

## 2025-08-20 PROCEDURE — 97165 OT EVAL LOW COMPLEX 30 MIN: CPT

## 2025-08-20 PROCEDURE — 85027 COMPLETE CBC AUTOMATED: CPT

## 2025-08-20 PROCEDURE — G0103: CPT

## 2025-08-20 PROCEDURE — 81001 URINALYSIS AUTO W/SCOPE: CPT

## 2025-08-20 PROCEDURE — 87637 SARSCOV2&INF A&B&RSV AMP PRB: CPT

## 2025-08-20 PROCEDURE — 83605 ASSAY OF LACTIC ACID: CPT

## 2025-08-20 PROCEDURE — 83735 ASSAY OF MAGNESIUM: CPT

## 2025-08-20 PROCEDURE — 86900 BLOOD TYPING SEROLOGIC ABO: CPT

## 2025-08-20 PROCEDURE — 93971 EXTREMITY STUDY: CPT

## 2025-08-20 PROCEDURE — 86901 BLOOD TYPING SEROLOGIC RH(D): CPT

## 2025-08-20 PROCEDURE — 85610 PROTHROMBIN TIME: CPT

## 2025-08-20 PROCEDURE — 87040 BLOOD CULTURE FOR BACTERIA: CPT

## 2025-08-20 PROCEDURE — 83880 ASSAY OF NATRIURETIC PEPTIDE: CPT

## 2025-08-20 PROCEDURE — 84295 ASSAY OF SERUM SODIUM: CPT

## 2025-08-20 PROCEDURE — 76376 3D RENDER W/INTRP POSTPROCES: CPT

## 2025-08-20 PROCEDURE — 86146 BETA-2 GLYCOPROTEIN ANTIBODY: CPT

## 2025-08-20 PROCEDURE — 85025 COMPLETE CBC W/AUTO DIFF WBC: CPT

## 2025-08-20 PROCEDURE — 86850 RBC ANTIBODY SCREEN: CPT

## 2025-08-20 PROCEDURE — 80053 COMPREHEN METABOLIC PANEL: CPT

## 2025-08-20 PROCEDURE — 84132 ASSAY OF SERUM POTASSIUM: CPT

## 2025-08-20 PROCEDURE — 80048 BASIC METABOLIC PNL TOTAL CA: CPT

## 2025-08-20 PROCEDURE — 36415 COLL VENOUS BLD VENIPUNCTURE: CPT

## 2025-08-20 PROCEDURE — 82803 BLOOD GASES ANY COMBINATION: CPT

## 2025-08-20 PROCEDURE — 71275 CT ANGIOGRAPHY CHEST: CPT

## 2025-08-20 PROCEDURE — 93356 MYOCRD STRAIN IMG SPCKL TRCK: CPT

## 2025-08-20 PROCEDURE — 97166 OT EVAL MOD COMPLEX 45 MIN: CPT

## 2025-08-20 PROCEDURE — 82330 ASSAY OF CALCIUM: CPT

## 2025-08-20 PROCEDURE — 86147 CARDIOLIPIN ANTIBODY EA IG: CPT

## 2025-08-20 RX ORDER — POLYETHYLENE GLYCOL 3350 17 G/17G
17 POWDER, FOR SOLUTION ORAL DAILY
Refills: 0 | Status: DISCONTINUED | OUTPATIENT
Start: 2025-08-20 | End: 2025-08-22

## 2025-08-20 RX ADMIN — ENOXAPARIN SODIUM 60 MILLIGRAM(S): 100 INJECTION SUBCUTANEOUS at 17:53

## 2025-08-20 RX ADMIN — Medication 95 MILLIGRAM(S): at 12:08

## 2025-08-20 RX ADMIN — POLYETHYLENE GLYCOL 3350 17 GRAM(S): 17 POWDER, FOR SOLUTION ORAL at 10:26

## 2025-08-20 RX ADMIN — ENOXAPARIN SODIUM 60 MILLIGRAM(S): 100 INJECTION SUBCUTANEOUS at 05:17

## 2025-08-20 RX ADMIN — Medication 40 MILLIEQUIVALENT(S): at 01:41

## 2025-08-21 PROCEDURE — 86147 CARDIOLIPIN ANTIBODY EA IG: CPT

## 2025-08-21 PROCEDURE — 82435 ASSAY OF BLOOD CHLORIDE: CPT

## 2025-08-21 PROCEDURE — 80048 BASIC METABOLIC PNL TOTAL CA: CPT

## 2025-08-21 PROCEDURE — 85018 HEMOGLOBIN: CPT

## 2025-08-21 PROCEDURE — G0103: CPT

## 2025-08-21 PROCEDURE — 93356 MYOCRD STRAIN IMG SPCKL TRCK: CPT

## 2025-08-21 PROCEDURE — 86900 BLOOD TYPING SEROLOGIC ABO: CPT

## 2025-08-21 PROCEDURE — 83605 ASSAY OF LACTIC ACID: CPT

## 2025-08-21 PROCEDURE — 84295 ASSAY OF SERUM SODIUM: CPT

## 2025-08-21 PROCEDURE — 99233 SBSQ HOSP IP/OBS HIGH 50: CPT

## 2025-08-21 PROCEDURE — 85027 COMPLETE CBC AUTOMATED: CPT

## 2025-08-21 PROCEDURE — 83735 ASSAY OF MAGNESIUM: CPT

## 2025-08-21 PROCEDURE — 82947 ASSAY GLUCOSE BLOOD QUANT: CPT

## 2025-08-21 PROCEDURE — 74177 CT ABD & PELVIS W/CONTRAST: CPT

## 2025-08-21 PROCEDURE — 86901 BLOOD TYPING SEROLOGIC RH(D): CPT

## 2025-08-21 PROCEDURE — 36415 COLL VENOUS BLD VENIPUNCTURE: CPT

## 2025-08-21 PROCEDURE — 83880 ASSAY OF NATRIURETIC PEPTIDE: CPT

## 2025-08-21 PROCEDURE — 97165 OT EVAL LOW COMPLEX 30 MIN: CPT

## 2025-08-21 PROCEDURE — 81001 URINALYSIS AUTO W/SCOPE: CPT

## 2025-08-21 PROCEDURE — 97161 PT EVAL LOW COMPLEX 20 MIN: CPT

## 2025-08-21 PROCEDURE — 71275 CT ANGIOGRAPHY CHEST: CPT

## 2025-08-21 PROCEDURE — 85025 COMPLETE CBC W/AUTO DIFF WBC: CPT

## 2025-08-21 PROCEDURE — 71045 X-RAY EXAM CHEST 1 VIEW: CPT

## 2025-08-21 PROCEDURE — 80053 COMPREHEN METABOLIC PANEL: CPT

## 2025-08-21 PROCEDURE — 82803 BLOOD GASES ANY COMBINATION: CPT

## 2025-08-21 PROCEDURE — 76376 3D RENDER W/INTRP POSTPROCES: CPT

## 2025-08-21 PROCEDURE — 86850 RBC ANTIBODY SCREEN: CPT

## 2025-08-21 PROCEDURE — 85014 HEMATOCRIT: CPT

## 2025-08-21 PROCEDURE — 97166 OT EVAL MOD COMPLEX 45 MIN: CPT

## 2025-08-21 PROCEDURE — 87040 BLOOD CULTURE FOR BACTERIA: CPT

## 2025-08-21 PROCEDURE — 93306 TTE W/DOPPLER COMPLETE: CPT

## 2025-08-21 PROCEDURE — 85730 THROMBOPLASTIN TIME PARTIAL: CPT

## 2025-08-21 PROCEDURE — 84484 ASSAY OF TROPONIN QUANT: CPT

## 2025-08-21 PROCEDURE — 86146 BETA-2 GLYCOPROTEIN ANTIBODY: CPT

## 2025-08-21 PROCEDURE — 85613 RUSSELL VIPER VENOM DILUTED: CPT

## 2025-08-21 PROCEDURE — 85610 PROTHROMBIN TIME: CPT

## 2025-08-21 PROCEDURE — 84132 ASSAY OF SERUM POTASSIUM: CPT

## 2025-08-21 PROCEDURE — 93005 ELECTROCARDIOGRAM TRACING: CPT

## 2025-08-21 PROCEDURE — 82330 ASSAY OF CALCIUM: CPT

## 2025-08-21 PROCEDURE — 87637 SARSCOV2&INF A&B&RSV AMP PRB: CPT

## 2025-08-21 PROCEDURE — 93971 EXTREMITY STUDY: CPT

## 2025-08-21 RX ORDER — APIXABAN 5 MG/1
10 TABLET, FILM COATED ORAL EVERY 12 HOURS
Refills: 0 | Status: DISCONTINUED | OUTPATIENT
Start: 2025-08-21 | End: 2025-08-22

## 2025-08-21 RX ORDER — AMOXICILLIN AND CLAVULANATE POTASSIUM 500; 125 MG/1; MG/1
1 TABLET, FILM COATED ORAL
Refills: 0 | Status: DISCONTINUED | OUTPATIENT
Start: 2025-08-21 | End: 2025-08-22

## 2025-08-21 RX ORDER — BISACODYL 5 MG
10 TABLET, DELAYED RELEASE (ENTERIC COATED) ORAL ONCE
Refills: 0 | Status: COMPLETED | OUTPATIENT
Start: 2025-08-21 | End: 2025-08-21

## 2025-08-21 RX ADMIN — APIXABAN 10 MILLIGRAM(S): 5 TABLET, FILM COATED ORAL at 17:06

## 2025-08-21 RX ADMIN — Medication 10 MILLIGRAM(S): at 13:03

## 2025-08-21 RX ADMIN — ENOXAPARIN SODIUM 60 MILLIGRAM(S): 100 INJECTION SUBCUTANEOUS at 05:26

## 2025-08-21 RX ADMIN — Medication 95 MILLIGRAM(S): at 11:24

## 2025-08-21 RX ADMIN — AMOXICILLIN AND CLAVULANATE POTASSIUM 1 TABLET(S): 500; 125 TABLET, FILM COATED ORAL at 17:06

## 2025-08-22 ENCOUNTER — TRANSCRIPTION ENCOUNTER (OUTPATIENT)
Age: 80
End: 2025-08-22

## 2025-08-22 VITALS
OXYGEN SATURATION: 97 % | RESPIRATION RATE: 18 BRPM | SYSTOLIC BLOOD PRESSURE: 130 MMHG | HEART RATE: 59 BPM | DIASTOLIC BLOOD PRESSURE: 70 MMHG | TEMPERATURE: 98 F

## 2025-08-22 LAB
ANION GAP SERPL CALC-SCNC: 14 MMOL/L — SIGNIFICANT CHANGE UP (ref 5–17)
BUN SERPL-MCNC: 21 MG/DL — SIGNIFICANT CHANGE UP (ref 7–23)
CALCIUM SERPL-MCNC: 9.9 MG/DL — SIGNIFICANT CHANGE UP (ref 8.4–10.5)
CHLORIDE SERPL-SCNC: 91 MMOL/L — LOW (ref 96–108)
CO2 SERPL-SCNC: 28 MMOL/L — SIGNIFICANT CHANGE UP (ref 22–31)
CREAT SERPL-MCNC: 1.06 MG/DL — SIGNIFICANT CHANGE UP (ref 0.5–1.3)
EGFR: 71 ML/MIN/1.73M2 — SIGNIFICANT CHANGE UP
EGFR: 71 ML/MIN/1.73M2 — SIGNIFICANT CHANGE UP
GLUCOSE SERPL-MCNC: 81 MG/DL — SIGNIFICANT CHANGE UP (ref 70–99)
HCT VFR BLD CALC: 38.7 % — LOW (ref 39–50)
HGB BLD-MCNC: 12.6 G/DL — LOW (ref 13–17)
MCHC RBC-ENTMCNC: 30.3 PG — SIGNIFICANT CHANGE UP (ref 27–34)
MCHC RBC-ENTMCNC: 32.6 G/DL — SIGNIFICANT CHANGE UP (ref 32–36)
MCV RBC AUTO: 93 FL — SIGNIFICANT CHANGE UP (ref 80–100)
NRBC # BLD AUTO: 0 K/UL — SIGNIFICANT CHANGE UP (ref 0–0)
NRBC # FLD: 0 K/UL — SIGNIFICANT CHANGE UP (ref 0–0)
NRBC BLD AUTO-RTO: 0 /100 WBCS — SIGNIFICANT CHANGE UP (ref 0–0)
PLATELET # BLD AUTO: 251 K/UL — SIGNIFICANT CHANGE UP (ref 150–400)
PMV BLD: 10.1 FL — SIGNIFICANT CHANGE UP (ref 7–13)
POTASSIUM SERPL-MCNC: 4 MMOL/L — SIGNIFICANT CHANGE UP (ref 3.5–5.3)
POTASSIUM SERPL-SCNC: 4 MMOL/L — SIGNIFICANT CHANGE UP (ref 3.5–5.3)
RBC # BLD: 4.16 M/UL — LOW (ref 4.2–5.8)
RBC # FLD: 11.9 % — SIGNIFICANT CHANGE UP (ref 10.3–14.5)
SODIUM SERPL-SCNC: 133 MMOL/L — LOW (ref 135–145)
WBC # BLD: 7.78 K/UL — SIGNIFICANT CHANGE UP (ref 3.8–10.5)
WBC # FLD AUTO: 7.78 K/UL — SIGNIFICANT CHANGE UP (ref 3.8–10.5)

## 2025-08-22 PROCEDURE — 82947 ASSAY GLUCOSE BLOOD QUANT: CPT

## 2025-08-22 PROCEDURE — 74177 CT ABD & PELVIS W/CONTRAST: CPT

## 2025-08-22 PROCEDURE — 86850 RBC ANTIBODY SCREEN: CPT

## 2025-08-22 PROCEDURE — 82803 BLOOD GASES ANY COMBINATION: CPT

## 2025-08-22 PROCEDURE — 85014 HEMATOCRIT: CPT

## 2025-08-22 PROCEDURE — 80048 BASIC METABOLIC PNL TOTAL CA: CPT

## 2025-08-22 PROCEDURE — 76376 3D RENDER W/INTRP POSTPROCES: CPT

## 2025-08-22 PROCEDURE — 85730 THROMBOPLASTIN TIME PARTIAL: CPT

## 2025-08-22 PROCEDURE — 99239 HOSP IP/OBS DSCHRG MGMT >30: CPT

## 2025-08-22 PROCEDURE — 82435 ASSAY OF BLOOD CHLORIDE: CPT

## 2025-08-22 PROCEDURE — 71275 CT ANGIOGRAPHY CHEST: CPT

## 2025-08-22 PROCEDURE — 83735 ASSAY OF MAGNESIUM: CPT

## 2025-08-22 PROCEDURE — 97165 OT EVAL LOW COMPLEX 30 MIN: CPT

## 2025-08-22 PROCEDURE — G0103: CPT

## 2025-08-22 PROCEDURE — 86146 BETA-2 GLYCOPROTEIN ANTIBODY: CPT

## 2025-08-22 PROCEDURE — 93971 EXTREMITY STUDY: CPT

## 2025-08-22 PROCEDURE — 83880 ASSAY OF NATRIURETIC PEPTIDE: CPT

## 2025-08-22 PROCEDURE — 93005 ELECTROCARDIOGRAM TRACING: CPT

## 2025-08-22 PROCEDURE — 82330 ASSAY OF CALCIUM: CPT

## 2025-08-22 PROCEDURE — 36415 COLL VENOUS BLD VENIPUNCTURE: CPT

## 2025-08-22 PROCEDURE — 84484 ASSAY OF TROPONIN QUANT: CPT

## 2025-08-22 PROCEDURE — 86147 CARDIOLIPIN ANTIBODY EA IG: CPT

## 2025-08-22 PROCEDURE — 97161 PT EVAL LOW COMPLEX 20 MIN: CPT

## 2025-08-22 PROCEDURE — 97530 THERAPEUTIC ACTIVITIES: CPT

## 2025-08-22 PROCEDURE — 87040 BLOOD CULTURE FOR BACTERIA: CPT

## 2025-08-22 PROCEDURE — 85025 COMPLETE CBC W/AUTO DIFF WBC: CPT

## 2025-08-22 PROCEDURE — 93356 MYOCRD STRAIN IMG SPCKL TRCK: CPT

## 2025-08-22 PROCEDURE — 85027 COMPLETE CBC AUTOMATED: CPT

## 2025-08-22 PROCEDURE — 71045 X-RAY EXAM CHEST 1 VIEW: CPT

## 2025-08-22 PROCEDURE — 84132 ASSAY OF SERUM POTASSIUM: CPT

## 2025-08-22 PROCEDURE — 85613 RUSSELL VIPER VENOM DILUTED: CPT

## 2025-08-22 PROCEDURE — 97110 THERAPEUTIC EXERCISES: CPT

## 2025-08-22 PROCEDURE — 85610 PROTHROMBIN TIME: CPT

## 2025-08-22 PROCEDURE — 85018 HEMOGLOBIN: CPT

## 2025-08-22 PROCEDURE — 97166 OT EVAL MOD COMPLEX 45 MIN: CPT

## 2025-08-22 PROCEDURE — 80053 COMPREHEN METABOLIC PANEL: CPT

## 2025-08-22 PROCEDURE — 83605 ASSAY OF LACTIC ACID: CPT

## 2025-08-22 PROCEDURE — 99285 EMERGENCY DEPT VISIT HI MDM: CPT | Mod: 25

## 2025-08-22 PROCEDURE — 87637 SARSCOV2&INF A&B&RSV AMP PRB: CPT

## 2025-08-22 PROCEDURE — 86900 BLOOD TYPING SEROLOGIC ABO: CPT

## 2025-08-22 PROCEDURE — 81001 URINALYSIS AUTO W/SCOPE: CPT

## 2025-08-22 PROCEDURE — 86901 BLOOD TYPING SEROLOGIC RH(D): CPT

## 2025-08-22 PROCEDURE — 96374 THER/PROPH/DIAG INJ IV PUSH: CPT

## 2025-08-22 PROCEDURE — 84295 ASSAY OF SERUM SODIUM: CPT

## 2025-08-22 PROCEDURE — 93306 TTE W/DOPPLER COMPLETE: CPT

## 2025-08-22 RX ORDER — ARIPIPRAZOLE 2 MG/1
1 TABLET ORAL
Qty: 30 | Refills: 0
Start: 2025-08-22 | End: 2025-09-20

## 2025-08-22 RX ORDER — METHADONE HCL 10 MG
19 TABLET ORAL
Qty: 0 | Refills: 0 | DISCHARGE
Start: 2025-08-22

## 2025-08-22 RX ORDER — NIFEDIPINE 30 MG
1 TABLET, EXTENDED RELEASE 24 HR ORAL
Refills: 0 | DISCHARGE

## 2025-08-22 RX ORDER — ARIPIPRAZOLE 2 MG/1
1 TABLET ORAL
Refills: 0 | DISCHARGE

## 2025-08-22 RX ORDER — GABAPENTIN 400 MG/1
1 CAPSULE ORAL
Refills: 0 | DISCHARGE

## 2025-08-22 RX ORDER — APIXABAN 5 MG/1
2 TABLET, FILM COATED ORAL
Qty: 0 | Refills: 0 | DISCHARGE
Start: 2025-08-22

## 2025-08-22 RX ORDER — MIRTAZAPINE 30 MG/1
1 TABLET, FILM COATED ORAL
Refills: 0 | DISCHARGE

## 2025-08-22 RX ORDER — GABAPENTIN 400 MG/1
1 CAPSULE ORAL
Qty: 30 | Refills: 0
Start: 2025-08-22 | End: 2025-09-20

## 2025-08-22 RX ORDER — METHADONE HCL 10 MG
95 TABLET ORAL
Refills: 0 | DISCHARGE

## 2025-08-22 RX ORDER — MIRTAZAPINE 30 MG/1
1 TABLET, FILM COATED ORAL
Qty: 30 | Refills: 0
Start: 2025-08-22 | End: 2025-09-20

## 2025-08-22 RX ORDER — CARVEDILOL 3.12 MG/1
1 TABLET, FILM COATED ORAL
Refills: 0 | DISCHARGE

## 2025-08-22 RX ORDER — AMOXICILLIN AND CLAVULANATE POTASSIUM 500; 125 MG/1; MG/1
1 TABLET, FILM COATED ORAL
Qty: 0 | Refills: 0 | DISCHARGE
Start: 2025-08-22

## 2025-08-22 RX ORDER — CHLORTHALIDONE 25 MG/1
1 TABLET ORAL
Refills: 0 | DISCHARGE

## 2025-08-22 RX ADMIN — AMOXICILLIN AND CLAVULANATE POTASSIUM 1 TABLET(S): 500; 125 TABLET, FILM COATED ORAL at 16:49

## 2025-08-22 RX ADMIN — APIXABAN 10 MILLIGRAM(S): 5 TABLET, FILM COATED ORAL at 05:50

## 2025-08-22 RX ADMIN — APIXABAN 10 MILLIGRAM(S): 5 TABLET, FILM COATED ORAL at 16:49

## 2025-08-22 RX ADMIN — AMOXICILLIN AND CLAVULANATE POTASSIUM 1 TABLET(S): 500; 125 TABLET, FILM COATED ORAL at 05:50

## 2025-08-22 RX ADMIN — Medication 95 MILLIGRAM(S): at 11:38

## 2025-08-23 LAB
CULTURE RESULTS: SIGNIFICANT CHANGE UP
CULTURE RESULTS: SIGNIFICANT CHANGE UP
SPECIMEN SOURCE: SIGNIFICANT CHANGE UP
SPECIMEN SOURCE: SIGNIFICANT CHANGE UP